# Patient Record
Sex: FEMALE | Race: WHITE | Employment: OTHER | URBAN - METROPOLITAN AREA
[De-identification: names, ages, dates, MRNs, and addresses within clinical notes are randomized per-mention and may not be internally consistent; named-entity substitution may affect disease eponyms.]

---

## 2017-12-06 LAB
HPV COMMENT: NORMAL
HPV TYPE 16: NOT DETECTED
HPV TYPE 18: NOT DETECTED
HPVOH (OTHER TYPES): NOT DETECTED

## 2020-03-17 ENCOUNTER — OFFICE VISIT (OUTPATIENT)
Dept: FAMILY MEDICINE CLINIC | Age: 40
End: 2020-03-17
Payer: COMMERCIAL

## 2020-03-17 VITALS
HEIGHT: 67 IN | OXYGEN SATURATION: 99 % | WEIGHT: 201.2 LBS | HEART RATE: 99 BPM | SYSTOLIC BLOOD PRESSURE: 120 MMHG | BODY MASS INDEX: 31.58 KG/M2 | DIASTOLIC BLOOD PRESSURE: 76 MMHG

## 2020-03-17 DIAGNOSIS — E03.9 ACQUIRED HYPOTHYROIDISM: ICD-10-CM

## 2020-03-17 DIAGNOSIS — Z00.00 WELL ADULT EXAM: ICD-10-CM

## 2020-03-17 PROBLEM — E66.811 CLASS 1 OBESITY DUE TO EXCESS CALORIES WITH SERIOUS COMORBIDITY AND BODY MASS INDEX (BMI) OF 31.0 TO 31.9 IN ADULT: Status: ACTIVE | Noted: 2020-03-17

## 2020-03-17 PROBLEM — E66.09 CLASS 1 OBESITY DUE TO EXCESS CALORIES WITH SERIOUS COMORBIDITY AND BODY MASS INDEX (BMI) OF 31.0 TO 31.9 IN ADULT: Status: ACTIVE | Noted: 2020-03-17

## 2020-03-17 LAB
A/G RATIO: 1.4 (ref 1.1–2.2)
ALBUMIN SERPL-MCNC: 4.4 G/DL (ref 3.4–5)
ALP BLD-CCNC: 52 U/L (ref 40–129)
ALT SERPL-CCNC: 32 U/L (ref 10–40)
ANION GAP SERPL CALCULATED.3IONS-SCNC: 19 MMOL/L (ref 3–16)
AST SERPL-CCNC: 20 U/L (ref 15–37)
BASOPHILS ABSOLUTE: 0 K/UL (ref 0–0.2)
BASOPHILS RELATIVE PERCENT: 0.7 %
BILIRUB SERPL-MCNC: 1.6 MG/DL (ref 0–1)
BUN BLDV-MCNC: 13 MG/DL (ref 7–20)
CALCIUM SERPL-MCNC: 9.1 MG/DL (ref 8.3–10.6)
CHLORIDE BLD-SCNC: 97 MMOL/L (ref 99–110)
CHOLESTEROL, TOTAL: 245 MG/DL (ref 0–199)
CO2: 18 MMOL/L (ref 21–32)
CREAT SERPL-MCNC: 0.6 MG/DL (ref 0.6–1.1)
CREATININE URINE: 185.8 MG/DL (ref 28–259)
EOSINOPHILS ABSOLUTE: 0.1 K/UL (ref 0–0.6)
EOSINOPHILS RELATIVE PERCENT: 2 %
GFR AFRICAN AMERICAN: >60
GFR NON-AFRICAN AMERICAN: >60
GLOBULIN: 3.1 G/DL
GLUCOSE BLD-MCNC: 264 MG/DL (ref 70–99)
HCT VFR BLD CALC: 44.1 % (ref 36–48)
HDLC SERPL-MCNC: 42 MG/DL (ref 40–60)
HEMOGLOBIN: 14.9 G/DL (ref 12–16)
LDL CHOLESTEROL CALCULATED: 159 MG/DL
LYMPHOCYTES ABSOLUTE: 1.6 K/UL (ref 1–5.1)
LYMPHOCYTES RELATIVE PERCENT: 26.3 %
MCH RBC QN AUTO: 30.8 PG (ref 26–34)
MCHC RBC AUTO-ENTMCNC: 33.9 G/DL (ref 31–36)
MCV RBC AUTO: 90.9 FL (ref 80–100)
MICROALBUMIN UR-MCNC: <1.2 MG/DL
MICROALBUMIN/CREAT UR-RTO: NORMAL MG/G (ref 0–30)
MONOCYTES ABSOLUTE: 0.5 K/UL (ref 0–1.3)
MONOCYTES RELATIVE PERCENT: 9 %
NEUTROPHILS ABSOLUTE: 3.7 K/UL (ref 1.7–7.7)
NEUTROPHILS RELATIVE PERCENT: 62 %
PDW BLD-RTO: 13.1 % (ref 12.4–15.4)
PLATELET # BLD: 235 K/UL (ref 135–450)
PMV BLD AUTO: 10.1 FL (ref 5–10.5)
POTASSIUM SERPL-SCNC: 4.2 MMOL/L (ref 3.5–5.1)
RBC # BLD: 4.85 M/UL (ref 4–5.2)
SODIUM BLD-SCNC: 134 MMOL/L (ref 136–145)
T4 FREE: 0.9 NG/DL (ref 0.9–1.8)
TOTAL PROTEIN: 7.5 G/DL (ref 6.4–8.2)
TRIGL SERPL-MCNC: 219 MG/DL (ref 0–150)
TSH SERPL DL<=0.05 MIU/L-ACNC: 8.56 UIU/ML (ref 0.27–4.2)
VLDLC SERPL CALC-MCNC: 44 MG/DL
WBC # BLD: 6 K/UL (ref 4–11)

## 2020-03-17 PROCEDURE — 99386 PREV VISIT NEW AGE 40-64: CPT | Performed by: NURSE PRACTITIONER

## 2020-03-17 PROCEDURE — 99202 OFFICE O/P NEW SF 15 MIN: CPT | Performed by: NURSE PRACTITIONER

## 2020-03-17 RX ORDER — OMEPRAZOLE 40 MG/1
40 CAPSULE, DELAYED RELEASE ORAL DAILY
COMMUNITY
Start: 2016-07-07 | End: 2020-03-17 | Stop reason: ALTCHOICE

## 2020-03-17 RX ORDER — LEVOTHYROXINE SODIUM 0.05 MG/1
50 TABLET ORAL DAILY
COMMUNITY
Start: 2018-07-24 | End: 2020-03-17 | Stop reason: ALTCHOICE

## 2020-03-17 RX ORDER — ALBUTEROL SULFATE 90 UG/1
2 AEROSOL, METERED RESPIRATORY (INHALATION) 4 TIMES DAILY PRN
COMMUNITY
Start: 2019-05-06 | End: 2020-03-17

## 2020-03-17 ASSESSMENT — PATIENT HEALTH QUESTIONNAIRE - PHQ9
2. FEELING DOWN, DEPRESSED OR HOPELESS: 0
SUM OF ALL RESPONSES TO PHQ9 QUESTIONS 1 & 2: 0
SUM OF ALL RESPONSES TO PHQ QUESTIONS 1-9: 0
SUM OF ALL RESPONSES TO PHQ QUESTIONS 1-9: 0
1. LITTLE INTEREST OR PLEASURE IN DOING THINGS: 0

## 2020-03-17 NOTE — PROGRESS NOTES
Chief Complaint:   Анна Ramírez is a 35 y.o. female who presents to [facility] care. Former PCP: Dr. Sarita Burnett, over 1 yr since last appt    History of Present Illness:    Diabetes: no meds in approx 18 months. Does not check sugars. Over 1 yr since last eye exam. Pt complains of nothing. Pt denies foot ulcerations, nausea, paresthesia of the feet, polydipsia, polyuria and visual disturbances. Stopped Metformin IR d/t diarrhea. Was prescribed Actos- never started d/t fear of side effects. Hypothyroidism: Recent symptoms: none. She denies fatigue, cold intolerance, hair loss, dry skin and constipation. Patient has not had meds in 18 months    Chronic low back pain: partial discectomy 2010, pain moderately controlled since. No current specialist.     Obesity: weight is stable, no current weight loss efforts. Walks when she can, diet mostly well controlled    Past Medical History:   Diagnosis Date    Acquired hypothyroidism 5/21/2018    Class 1 obesity due to excess calories with serious comorbidity and body mass index (BMI) of 31.0 to 31.9 in adult     Degeneration of lumbar or lumbosacral intervertebral disc 12/2/2009    Lumbosacral spondylosis without myelopathy 12/2/2009    Uncontrolled type 2 diabetes mellitus without complication, without long-term current use of insulin ([facility] [address] 5/21/2018       Past Surgical History:   Procedure Laterality Date    BACK SURGERY  2010    L4-L5 partial disctectomy        No outpatient medications have been marked as taking for the 3/17/20 encounter (Office Visit) with DULCE Edwards CNP.      Allergies   Allergen Reactions    Metformin Diarrhea       Social History     Socioeconomic History    Marital status:      Spouse name: None    Number of children: None    Years of education: None    Highest education level: None   Occupational History    None   Social Needs    Financial resource strain: None    Food insecurity     Worry: None     Inability: palpitations  Gastrointestinal: no nausea, vomiting, heartburn, diarrhea, constipation, bloating, or abdominal pain  Genitourinary: no urinary urgency, frequency, dysuria, nocturia, hesitancy, or incontinence  Musculoskeletal: no arthritis, arthralgia, myalgia, weakness, or morning stiffness  Neurologic: no paralysis, paresis, paresthesia, seizures, tremors, or headaches  Hematologic/Lymphatic/Immunologic: no anemia, abnormal bleeding/bruising, fever, chills, night sweats, swollen glands, or unexplained weight loss  Endocrine: no heat or cold intolerance and no polyphagia, polydipsia, or polyuria    PHYSICAL EXAMINATION:  /76   Pulse 99   Ht 5' 6.5\" (1.689 m)   Wt 201 lb 3.2 oz (91.3 kg)   SpO2 99%   Breastfeeding No   BMI 31.99 kg/m²   Constitutional: Oriented to person, place, and time. Appears well-developed and well-nourished. No distress. HENT:   Head: Normocephalic and atraumatic. Ears: Hearing, tympanic membrane, external ear and ear canal normal.   Nose: Nose normal.   Mouth/Throat: Uvula is midline, oropharynx is clear and moist and mucous membranes are normal.   Eyes: Conjunctivae and EOM are normal. Pupils are equal, round, and reactive to light. Neck: Normal range of motion. Neck supple. Normal carotid pulses and no JVD present. Carotid bruit is not present. No tracheal deviation present. No mass and no thyromegaly present. Cardiovascular: Normal rate, regular rhythm, S1 normal, S2 normal, normal heart sounds and intact distal pulses. No murmur heard. Pulmonary/Chest: Effort normal and breath sounds normal. No stridor. No respiratory distress. No decreased breath sounds. No wheezes. No rhonchi. No rales. Abdominal: Soft. Normal appearance and bowel sounds are normal. No distension, no abdominal bruit and no mass. There is no hepatomegaly. No tenderness. Musculoskeletal: Normal range of motion. No edema. Lymphadenopathy:     No cervical adenopathy.        No supraclavicular body mass index (BMI) of 31.0 to 31.9 in adult  Strongly recommend eliminating concentrated sweets, reducing simple carbs. Avoid corn syrup and hydrogenated oils. Focus on fruits, vegs, whole grains, lean meats and push water. Fish oil, high fiber foods recommended. Recommended at least 30 minutes of aerobic exercise daily. 6. Acquired hypothyroidism  Awaiting TSH to determine med dosage  -     T4, Free; Future  -     TSH without Reflex; Future    7. Screening for breast cancer  -     JANELLE PEYTON DIGITAL SCREEN BILATERAL; Future    8. Encounter to establish care  No old records for review  Office policies reviewed today  See pt instructions  F/u prn  Discussed use, benefit, and side effects of prescribed medications. All patient questions answered. Pt voiced understanding.

## 2020-03-17 NOTE — PATIENT INSTRUCTIONS
condoms. For men  · Tests for sexually transmitted infections (STIs). Ask whether you should have tests for STIs. You may be at risk if you have sex with more than one person, especially if you do not wear a condom. · Testicular cancer exam. Ask your doctor whether you should check your testicles regularly. · Prostate exam. Talk to your doctor about whether you should have a blood test (called a PSA test) for prostate cancer. Experts differ on whether and when men should have this test. Some experts suggest it if you are older than 39 and are -American or have a father or brother who got prostate cancer when he was younger than 72. When should you call for help? Watch closely for changes in your health, and be sure to contact your doctor if you have any problems or symptoms that concern you. Where can you learn more? Go to https://Voaltepecateb.healthMixP3 Inc.. org and sign in to your [employer] account. Enter P072 in the [employer] box to learn more about \"Well Visit, Ages 25 to 48: Care Instructions. \"     If you do not have an account, please click on the \"Sign Up Now\" link. Current as of: August 21, 2019Content Version: 12.4  © 6668-6067 Healthwise, Incorporated. Care instructions adapted under license by [facility] ([facility]). If you have questions about a medical condition or this instruction, always ask your healthcare professional. [address]  any warranty or liability for your use of this information.

## 2020-03-18 LAB
ESTIMATED AVERAGE GLUCOSE: 220.2 MG/DL
HBA1C MFR BLD: 9.3 %

## 2020-03-18 RX ORDER — ATORVASTATIN CALCIUM 20 MG/1
20 TABLET, FILM COATED ORAL DAILY
Qty: 90 TABLET | Refills: 1 | Status: SHIPPED | OUTPATIENT
Start: 2020-03-18 | End: 2020-09-21

## 2020-03-18 RX ORDER — LEVOTHYROXINE SODIUM 0.05 MG/1
50 TABLET ORAL DAILY
Qty: 90 TABLET | Refills: 0 | Status: SHIPPED | OUTPATIENT
Start: 2020-03-18 | End: 2020-06-16 | Stop reason: SDUPTHER

## 2020-03-18 RX ORDER — METFORMIN HYDROCHLORIDE 500 MG/1
TABLET, EXTENDED RELEASE ORAL
Qty: 90 TABLET | Refills: 1 | Status: SHIPPED | OUTPATIENT
Start: 2020-03-18 | End: 2020-06-12

## 2020-03-18 RX ORDER — ASPIRIN 81 MG/1
81 TABLET ORAL DAILY
Qty: 90 TABLET | Refills: 1 | Status: SHIPPED | OUTPATIENT
Start: 2020-03-18 | End: 2020-09-21

## 2020-03-18 RX ORDER — LISINOPRIL 5 MG/1
5 TABLET ORAL DAILY
Qty: 90 TABLET | Refills: 1 | Status: SHIPPED | OUTPATIENT
Start: 2020-03-18 | End: 2020-09-21

## 2020-06-12 RX ORDER — METFORMIN HYDROCHLORIDE 500 MG/1
TABLET, EXTENDED RELEASE ORAL
Qty: 90 TABLET | Refills: 2 | Status: SHIPPED | OUTPATIENT
Start: 2020-06-12 | End: 2020-10-07 | Stop reason: DRUGHIGH

## 2020-06-12 NOTE — TELEPHONE ENCOUNTER
Medication:   Requested Prescriptions     Pending Prescriptions Disp Refills    metFORMIN (GLUCOPHAGE-XR) 500 MG extended release tablet [Pharmacy Med Name: METFORMIN HCL  MG TABLET] 90 tablet 1     Sig: TAKE 1 TABLET BY MOUTH AFTER EVENING MEAL FOR 2 WEEKS, THEN INCREASE TO 2 TABLETS       Last Filled:  3/18/20, 90, 1 refill     Patient Phone Number: [phone] (home)     Last appt: 3/17/2020   Next appt: 6/15/2020    Last Labs DM:   Lab Results   Component Value Date    LABA1C 9.3 03/17/2020

## 2020-06-15 ENCOUNTER — OFFICE VISIT (OUTPATIENT)
Dept: FAMILY MEDICINE CLINIC | Age: 40
End: 2020-06-15
Payer: COMMERCIAL

## 2020-06-15 VITALS
OXYGEN SATURATION: 99 % | TEMPERATURE: 97.2 F | WEIGHT: 202.4 LBS | DIASTOLIC BLOOD PRESSURE: 84 MMHG | BODY MASS INDEX: 32.18 KG/M2 | HEART RATE: 95 BPM | SYSTOLIC BLOOD PRESSURE: 112 MMHG

## 2020-06-15 PROBLEM — E78.5 HYPERLIPIDEMIA: Status: ACTIVE | Noted: 2020-06-15

## 2020-06-15 LAB — HBA1C MFR BLD: 8.9 %

## 2020-06-15 PROCEDURE — 3052F HG A1C>EQUAL 8.0%<EQUAL 9.0%: CPT | Performed by: NURSE PRACTITIONER

## 2020-06-15 PROCEDURE — 83036 HEMOGLOBIN GLYCOSYLATED A1C: CPT | Performed by: NURSE PRACTITIONER

## 2020-06-15 PROCEDURE — 99214 OFFICE O/P EST MOD 30 MIN: CPT | Performed by: NURSE PRACTITIONER

## 2020-06-15 RX ORDER — EMPAGLIFLOZIN 10 MG/1
1 TABLET, FILM COATED ORAL DAILY
Qty: 30 TABLET | Refills: 5 | Status: SHIPPED | OUTPATIENT
Start: 2020-06-15 | End: 2020-10-07

## 2020-06-15 ASSESSMENT — ENCOUNTER SYMPTOMS
NAUSEA: 0
VOMITING: 0
ABDOMINAL PAIN: 0
DIARRHEA: 0
SHORTNESS OF BREATH: 0

## 2020-06-16 ENCOUNTER — TELEPHONE (OUTPATIENT)
Dept: FAMILY MEDICINE CLINIC | Age: 40
End: 2020-06-16

## 2020-06-16 LAB
T4 FREE: 1.1 NG/DL (ref 0.9–1.8)
TSH SERPL DL<=0.05 MIU/L-ACNC: 5.11 UIU/ML (ref 0.27–4.2)

## 2020-06-16 RX ORDER — LEVOTHYROXINE SODIUM 0.07 MG/1
75 TABLET ORAL DAILY
Qty: 30 TABLET | Refills: 5 | Status: SHIPPED | OUTPATIENT
Start: 2020-06-16 | End: 2021-06-09 | Stop reason: SDUPTHER

## 2020-07-20 ENCOUNTER — HOSPITAL ENCOUNTER (OUTPATIENT)
Dept: MAMMOGRAPHY | Age: 40
Discharge: HOME | End: 2020-07-20
Payer: COMMERCIAL

## 2020-07-20 PROCEDURE — 77063 BREAST TOMOSYNTHESIS BI: CPT

## 2020-09-21 RX ORDER — ASPIRIN 81 MG/1
TABLET, COATED ORAL
Qty: 90 TABLET | Refills: 1 | Status: SHIPPED | OUTPATIENT
Start: 2020-09-21 | End: 2021-03-30

## 2020-09-21 RX ORDER — ATORVASTATIN CALCIUM 20 MG/1
TABLET, FILM COATED ORAL
Qty: 90 TABLET | Refills: 0 | Status: SHIPPED | OUTPATIENT
Start: 2020-09-21 | End: 2020-12-21 | Stop reason: SDUPTHER

## 2020-09-21 RX ORDER — LISINOPRIL 5 MG/1
TABLET ORAL
Qty: 90 TABLET | Refills: 0 | Status: SHIPPED | OUTPATIENT
Start: 2020-09-21 | End: 2020-12-21

## 2020-10-07 ENCOUNTER — OFFICE VISIT (OUTPATIENT)
Dept: FAMILY MEDICINE CLINIC | Age: 40
End: 2020-10-07
Payer: COMMERCIAL

## 2020-10-07 VITALS
BODY MASS INDEX: 31.99 KG/M2 | HEART RATE: 92 BPM | SYSTOLIC BLOOD PRESSURE: 104 MMHG | WEIGHT: 201.2 LBS | OXYGEN SATURATION: 99 % | DIASTOLIC BLOOD PRESSURE: 76 MMHG

## 2020-10-07 PROBLEM — E11.9 TYPE 2 DIABETES MELLITUS WITHOUT COMPLICATION, WITHOUT LONG-TERM CURRENT USE OF INSULIN (HCC): Status: ACTIVE | Noted: 2018-05-21

## 2020-10-07 LAB — HBA1C MFR BLD: 8.5 %

## 2020-10-07 PROCEDURE — 90472 IMMUNIZATION ADMIN EACH ADD: CPT | Performed by: NURSE PRACTITIONER

## 2020-10-07 PROCEDURE — 90686 IIV4 VACC NO PRSV 0.5 ML IM: CPT | Performed by: NURSE PRACTITIONER

## 2020-10-07 PROCEDURE — 90471 IMMUNIZATION ADMIN: CPT | Performed by: NURSE PRACTITIONER

## 2020-10-07 PROCEDURE — 3052F HG A1C>EQUAL 8.0%<EQUAL 9.0%: CPT | Performed by: NURSE PRACTITIONER

## 2020-10-07 PROCEDURE — 83036 HEMOGLOBIN GLYCOSYLATED A1C: CPT | Performed by: NURSE PRACTITIONER

## 2020-10-07 PROCEDURE — 99214 OFFICE O/P EST MOD 30 MIN: CPT | Performed by: NURSE PRACTITIONER

## 2020-10-07 PROCEDURE — 90732 PPSV23 VACC 2 YRS+ SUBQ/IM: CPT | Performed by: NURSE PRACTITIONER

## 2020-10-07 RX ORDER — DIMETHICONE 13 MG/ML
1 LOTION TOPICAL DAILY
Qty: 100 EACH | Refills: 3 | Status: SHIPPED | OUTPATIENT
Start: 2020-10-07 | End: 2022-02-21

## 2020-10-07 RX ORDER — BLOOD-GLUCOSE METER
1 KIT MISCELLANEOUS DAILY
Qty: 1 KIT | Refills: 0 | Status: SHIPPED | OUTPATIENT
Start: 2020-10-07

## 2020-10-07 RX ORDER — CANAGLIFLOZIN 100 MG/1
100 TABLET, FILM COATED ORAL
Qty: 90 TABLET | Refills: 1 | Status: SHIPPED | OUTPATIENT
Start: 2020-10-07 | End: 2020-11-30

## 2020-10-07 RX ORDER — METFORMIN HYDROCHLORIDE 500 MG/1
TABLET, EXTENDED RELEASE ORAL
Qty: 90 TABLET | Refills: 2 | Status: SHIPPED | OUTPATIENT
Start: 2020-10-07 | End: 2020-11-02

## 2020-10-07 ASSESSMENT — ENCOUNTER SYMPTOMS
VOMITING: 0
NAUSEA: 0
SHORTNESS OF BREATH: 0
DIARRHEA: 0
ABDOMINAL PAIN: 0

## 2020-10-07 NOTE — PATIENT INSTRUCTIONS
Patient Education        Learning About the Risk of Heart Attack and Stroke With Diabetes  How are diabetes, heart attack, and stroke connected? For some people, diabetes can cause problems that increase the risk of a heart attack or stroke. Many things can lead to a heart attack or stroke. These include high blood sugar, insulin resistance, high cholesterol, and high blood pressure. Lifestyle and genetics may also play a part. But here's the good news: The things you're doing to stay healthy with diabetes also help your heart and blood vessels. That means eating healthy foods, quitting smoking, and getting exercise. What increases your risk for heart attack and stroke? When you have diabetes, your risk for heart attack and stroke is even higher if you have:  · High blood pressure. It pushes blood through the arteries with too much force. Over time, this damages the walls of the arteries. · High cholesterol. It causes the buildup of a kind of fat inside the blood vessel walls. This buildup can lower blood flow to the heart muscle and raise your risk for having a heart attack or stroke. · Kidney damage. It shares many of the risk factors for heart attack and stroke (such as high blood sugar, high blood pressure, and high cholesterol). How do you keep your heart healthy when you have diabetes? Managing your diabetes and keeping your heart and blood vessels healthy are both important. Here are some things you can do. · Test your blood sugar levels and get your diabetes tests on schedule. Try to keep your numbers within your target range. · Keep track of your blood pressure. Your doctor will give you a goal that's right for you. If your blood pressure is high, your treatment may also include medicine. Changes in your lifestyle, such as staying at a healthy weight, may also help you lower your blood pressure. · Eat heart-healthy foods.    These include fruits, vegetables, whole grains, fish, and low-fat or nonfat dairy foods. Limit sodium, alcohol, and sweets. · If your doctor recommends it, get more exercise. Walking is a good choice. Bit by bit, increase the amount you walk every day. Try for at least 30 minutes on most days of the week. · Don't smoke. Smoking can make diabetes worse and increase your risk of heart attack or stroke. If you need help quitting, talk to your doctor about stop-smoking programs and medicines. These can increase your chances of quitting for good. · Think about taking medicines for your heart. For example, your doctor may suggest taking a statin or daily aspirin. Where can you learn more? Go to https://Eco-Source TechnologiespeAnyPresenceeb.IPTEGO. org and sign in to your [employer] account. Enter J703 in the [employer] box to learn more about \"Learning About the Risk of Heart Attack and Stroke With Diabetes. \"     If you do not have an account, please click on the \"Sign Up Now\" link. Current as of: December 20, 2019               Content Version: 12.6  © 7334-0646 [employer], Incorporated. Care instructions adapted under license by [facility] ([facility]). If you have questions about a medical condition or this instruction, always ask your healthcare professional. [place] [address] any warranty or liability for your use of this information. unknown no

## 2020-10-07 NOTE — PROGRESS NOTES
Vaccine Information Sheet, \"Influenza - Inactivated\"  given to Mery Singletno, or parent/legal guardian of  Mery Singleton and verbalized understanding. Patient responses:    Have you ever had a reaction to a flu vaccine? No  Do you have any current illness? No  Have you ever had Guillian Jackson Syndrome? No  Do you have a serious allergy to any of the follow: Neomycin, Polymyxin, Thimerosal, eggs or egg products? No    Flu vaccine given per order. Please see immunization tab. Risks and benefits explained. Current VIS given.

## 2020-10-07 NOTE — PROGRESS NOTES
Raquel Nance is a 38 y.o. female. HPI:  Diabetes Follow-up--   Lab Results   Component Value Date    LABA1C 8.5 10/07/2020      Lab Results   Component Value Date    .2 03/17/2020     Checks sugars at home:No. patient does not test.  Last Eye Exam was 7/17/20. Pt is on ACEI or ARB. Pt complains of polyuria and polydipsia. Pt denies foot ulcerations, nausea, paresthesia of the feet, and visual disturbances. She also denies chest pain, dyspnea, headache, palpitations and peripheral edema. She is taking ASA 81 mg daily. She is taking Metformin 500mg with dinner daily. Diarrhea manageable with only taking once a day. Has not been taking Jardiance consistently d/t cost. Over $400/month. Diet is \"ok\". Hyperlipidemia:  She does not have myalgias from medication. Pt is  following Lifestyle modification including Low fat/low cholesterol diet,low carbohydrate diet, and does exercise : occasional walking. Lab Results   Component Value Date    LDLCALC 159 (H) 03/17/2020   . Hypothyroidism: Recent symptoms: none. She denies fatigue, weight gain, weight loss, cold intolerance, heat intolerance, hair loss, dry skin, constipation and diarrhea. Patient is  taking her medication consistently on an empty stomach.     Lab Results   Component Value Date    TSH 5.11 (H) 06/15/2020    TSH 8.56 (H) 03/17/2020    TSH 7.22 (H) 04/18/2012       Current Outpatient Medications   Medication Sig Dispense Refill    metFORMIN (GLUCOPHAGE-XR) 500 MG extended release tablet TAKE 1 TABLET BY MOUTH AFTER EVENING MEAL 90 tablet 2    canagliflozin (INVOKANA) 100 MG TABS tablet Take 1 tablet by mouth every morning (before breakfast) 90 tablet 1    Blood Glucose Monitoring Suppl (TRUERESULT BLOOD GLUCOSE) w/Device KIT 1 kit by Does not apply route daily 1 kit 0    [facility] Lancets Original MISC 1 each by Does not apply route daily 100 each 3    ASPIRIN LOW DOSE 81 MG EC tablet TAKE 1 TABLET BY MOUTH EVERY DAY 90 tablet 1  lisinopril (PRINIVIL;ZESTRIL) 5 MG tablet TAKE 1 TABLET BY MOUTH EVERY DAY 90 tablet 0    atorvastatin (LIPITOR) 20 MG tablet TAKE 1 TABLET BY MOUTH EVERY DAY 90 tablet 0    levothyroxine (SYNTHROID) 75 MCG tablet Take 1 tablet by mouth daily 30 tablet 5     No current facility-administered medications for this visit. Health Maintenance   Topic Date Due    Hepatitis B vaccine (1 of 3 - Risk 3-dose series) 10/07/2021 (Originally 7/7/1998)    Diabetic microalbuminuria test  03/17/2021    Lipid screen  03/17/2021    Potassium monitoring  03/17/2021    Creatinine monitoring  03/17/2021    Diabetic foot exam  06/15/2021    TSH testing  06/15/2021    Diabetic retinal exam  07/17/2021    A1C test (Diabetic or Prediabetic)  10/07/2021    Cervical cancer screen  12/04/2022    DTaP/Tdap/Td vaccine (2 - Td) 12/02/2024    Flu vaccine  Completed    Pneumococcal 0-64 years Vaccine  Completed    Hepatitis A vaccine  Aged Out    Hib vaccine  Aged Out    Meningococcal (ACWY) vaccine  Aged Out    HIV screen  Discontinued       Review of Systems   Constitutional: Negative for diaphoresis. Respiratory: Negative for shortness of breath. Cardiovascular: Negative for chest pain, palpitations and leg swelling. Gastrointestinal: Negative for abdominal pain, diarrhea, nausea and vomiting. Endocrine: Negative for polydipsia. Musculoskeletal: Negative for myalgias. Neurological: Negative for dizziness and headaches. I have reviewed the patient's medical history in detail and updated the computerized patient record as appropriate. Physical Exam  Vitals signs reviewed. Constitutional:       Appearance: Normal appearance. She is well-developed. Neck:      Vascular: No carotid bruit. Cardiovascular:      Rate and Rhythm: Normal rate and regular rhythm. Pulses:           Radial pulses are 2+ on the right side and 2+ on the left side. Heart sounds: Normal heart sounds. No murmur. fruits, vegs, whole grains, lean meats and push water. Fish oil, high fiber foods recommended. Recommended at least 30 minutes of aerobic exercise daily. 4. Acquired hypothyroidism  Awaiting Repeat TSH  No changes today    5. Need for vaccination  -     INFLUENZA, QUADV, 3 YRS AND OLDER, IM PF, PREFILL SYR OR SDV, 0.5ML (AFLURIA QUADV, PF)  -     Pneumococcal polysaccharide vaccine 23-valent greater than or equal to 3yo subcutaneous/IM  See pt instructions  F/u 3 months, sooner prn  Discussed use, benefit, and side effects of prescribed medications. All patient questions answered. Pt voiced understanding.

## 2020-11-30 RX ORDER — CANAGLIFLOZIN 100 MG/1
TABLET, FILM COATED ORAL
Qty: 30 TABLET | Refills: 5 | Status: SHIPPED | OUTPATIENT
Start: 2020-11-30 | End: 2021-03-16

## 2020-11-30 NOTE — TELEPHONE ENCOUNTER
Medication:   Requested Prescriptions     Pending Prescriptions Disp Refills    INVOKANA 100 MG TABS tablet [Pharmacy Med Name: INVOKANA 100 MG TABLET] 30 tablet 5     Sig: TAKE 1 TABLET BY MOUTH EVERY DAY BEFORE BREAKFAST       Last Filled:  10/07/2020 #90 1 rf     Patient Phone Number: [phone] (home)     Last appt: 10/7/2020   Next appt: Visit date not found    Last Labs DM:   Lab Results   Component Value Date    LABA1C 8.5 10/07/2020

## 2020-12-21 RX ORDER — LISINOPRIL 5 MG/1
5 TABLET ORAL DAILY
Qty: 90 TABLET | Refills: 1 | Status: SHIPPED | OUTPATIENT
Start: 2020-12-21 | End: 2021-07-06

## 2020-12-21 RX ORDER — ATORVASTATIN CALCIUM 20 MG/1
TABLET, FILM COATED ORAL
Qty: 90 TABLET | Refills: 3 | Status: SHIPPED | OUTPATIENT
Start: 2020-12-21 | End: 2021-03-17 | Stop reason: SDUPTHER

## 2020-12-21 NOTE — TELEPHONE ENCOUNTER
Medication and Quantity requested: atorvastatin (LIPITOR) 20 MG tablet-90 tablets with 1 refill-         Last Visit  10/7/2020    Pharmacy and phone number updated in EPIC:  yes  [facility] FAX: [phone]

## 2020-12-29 ENCOUNTER — TELEPHONE (OUTPATIENT)
Dept: FAMILY MEDICINE CLINIC | Age: 40
End: 2020-12-29

## 2020-12-29 NOTE — TELEPHONE ENCOUNTER
Pt states insurance is not covering jardiance or invakana. She wants to know is there any other alternative?

## 2021-03-16 ENCOUNTER — OFFICE VISIT (OUTPATIENT)
Dept: FAMILY MEDICINE CLINIC | Age: 41
End: 2021-03-16
Payer: COMMERCIAL

## 2021-03-16 ENCOUNTER — TELEPHONE (OUTPATIENT)
Dept: FAMILY MEDICINE CLINIC | Age: 41
End: 2021-03-16

## 2021-03-16 VITALS
WEIGHT: 201.2 LBS | DIASTOLIC BLOOD PRESSURE: 78 MMHG | BODY MASS INDEX: 31.99 KG/M2 | HEART RATE: 101 BPM | OXYGEN SATURATION: 100 % | SYSTOLIC BLOOD PRESSURE: 114 MMHG

## 2021-03-16 DIAGNOSIS — Z00.00 WELL ADULT EXAM: ICD-10-CM

## 2021-03-16 DIAGNOSIS — E11.9 TYPE 2 DIABETES MELLITUS WITHOUT COMPLICATION, WITHOUT LONG-TERM CURRENT USE OF INSULIN (HCC): Primary | ICD-10-CM

## 2021-03-16 DIAGNOSIS — E03.9 ACQUIRED HYPOTHYROIDISM: ICD-10-CM

## 2021-03-16 DIAGNOSIS — E78.5 HYPERLIPIDEMIA, UNSPECIFIED HYPERLIPIDEMIA TYPE: ICD-10-CM

## 2021-03-16 DIAGNOSIS — E66.09 CLASS 1 OBESITY DUE TO EXCESS CALORIES WITH SERIOUS COMORBIDITY AND BODY MASS INDEX (BMI) OF 31.0 TO 31.9 IN ADULT: ICD-10-CM

## 2021-03-16 LAB
A/G RATIO: 1.5 (ref 1.1–2.2)
ALBUMIN SERPL-MCNC: 4.5 G/DL (ref 3.4–5)
ALP BLD-CCNC: 52 U/L (ref 40–129)
ALT SERPL-CCNC: 42 U/L (ref 10–40)
ANION GAP SERPL CALCULATED.3IONS-SCNC: 16 MMOL/L (ref 3–16)
AST SERPL-CCNC: 24 U/L (ref 15–37)
BASOPHILS ABSOLUTE: 0 K/UL (ref 0–0.2)
BASOPHILS RELATIVE PERCENT: 0.5 %
BILIRUB SERPL-MCNC: 1.7 MG/DL (ref 0–1)
BUN BLDV-MCNC: 11 MG/DL (ref 7–20)
CALCIUM SERPL-MCNC: 9.4 MG/DL (ref 8.3–10.6)
CHLORIDE BLD-SCNC: 100 MMOL/L (ref 99–110)
CHOLESTEROL, FASTING: 171 MG/DL (ref 0–199)
CO2: 21 MMOL/L (ref 21–32)
CREAT SERPL-MCNC: 0.6 MG/DL (ref 0.6–1.1)
EOSINOPHILS ABSOLUTE: 0.1 K/UL (ref 0–0.6)
EOSINOPHILS RELATIVE PERCENT: 0.9 %
GFR AFRICAN AMERICAN: >60
GFR NON-AFRICAN AMERICAN: >60
GLOBULIN: 3 G/DL
GLUCOSE FASTING: 204 MG/DL (ref 70–99)
HBA1C MFR BLD: 9 %
HCT VFR BLD CALC: 45.3 % (ref 36–48)
HDLC SERPL-MCNC: 44 MG/DL (ref 40–60)
HEMOGLOBIN: 15 G/DL (ref 12–16)
LDL CHOLESTEROL CALCULATED: 106 MG/DL
LYMPHOCYTES ABSOLUTE: 1.6 K/UL (ref 1–5.1)
LYMPHOCYTES RELATIVE PERCENT: 25.2 %
MCH RBC QN AUTO: 29.9 PG (ref 26–34)
MCHC RBC AUTO-ENTMCNC: 33.2 G/DL (ref 31–36)
MCV RBC AUTO: 90.2 FL (ref 80–100)
MONOCYTES ABSOLUTE: 0.5 K/UL (ref 0–1.3)
MONOCYTES RELATIVE PERCENT: 7 %
NEUTROPHILS ABSOLUTE: 4.3 K/UL (ref 1.7–7.7)
NEUTROPHILS RELATIVE PERCENT: 66.4 %
PDW BLD-RTO: 13.2 % (ref 12.4–15.4)
PLATELET # BLD: 279 K/UL (ref 135–450)
PMV BLD AUTO: 9.8 FL (ref 5–10.5)
POTASSIUM SERPL-SCNC: 4.2 MMOL/L (ref 3.5–5.1)
RBC # BLD: 5.03 M/UL (ref 4–5.2)
SODIUM BLD-SCNC: 137 MMOL/L (ref 136–145)
T4 FREE: 1.2 NG/DL (ref 0.9–1.8)
TOTAL PROTEIN: 7.5 G/DL (ref 6.4–8.2)
TRIGLYCERIDE, FASTING: 103 MG/DL (ref 0–150)
TSH SERPL DL<=0.05 MIU/L-ACNC: 3.8 UIU/ML (ref 0.27–4.2)
VLDLC SERPL CALC-MCNC: 21 MG/DL
WBC # BLD: 6.5 K/UL (ref 4–11)

## 2021-03-16 PROCEDURE — 83036 HEMOGLOBIN GLYCOSYLATED A1C: CPT | Performed by: NURSE PRACTITIONER

## 2021-03-16 PROCEDURE — 3052F HG A1C>EQUAL 8.0%<EQUAL 9.0%: CPT | Performed by: NURSE PRACTITIONER

## 2021-03-16 PROCEDURE — 99214 OFFICE O/P EST MOD 30 MIN: CPT | Performed by: NURSE PRACTITIONER

## 2021-03-16 RX ORDER — DULAGLUTIDE 0.75 MG/.5ML
0.75 INJECTION, SOLUTION SUBCUTANEOUS WEEKLY
Qty: 4 PEN | Refills: 5 | Status: SHIPPED | OUTPATIENT
Start: 2021-03-16 | End: 2021-06-09 | Stop reason: ALTCHOICE

## 2021-03-16 ASSESSMENT — PATIENT HEALTH QUESTIONNAIRE - PHQ9
SUM OF ALL RESPONSES TO PHQ9 QUESTIONS 1 & 2: 0
SUM OF ALL RESPONSES TO PHQ QUESTIONS 1-9: 0
2. FEELING DOWN, DEPRESSED OR HOPELESS: 0
1. LITTLE INTEREST OR PLEASURE IN DOING THINGS: 0
SUM OF ALL RESPONSES TO PHQ QUESTIONS 1-9: 0
SUM OF ALL RESPONSES TO PHQ QUESTIONS 1-9: 0

## 2021-03-16 ASSESSMENT — ENCOUNTER SYMPTOMS
NAUSEA: 0
DIARRHEA: 0
SHORTNESS OF BREATH: 0
VOMITING: 0
ABDOMINAL PAIN: 0

## 2021-03-16 NOTE — TELEPHONE ENCOUNTER
Pt had an appt with Neena Baumgarten today. Pt forgot to give Rosana TolliverBaumgarten the Preventive Screening form to complete. Pt said her insurance runs August - July. Her insurance is ok with having paperwork filled out 2 months late.   She is asking that this form be dated September 2020.    (scanned form into media)

## 2021-03-16 NOTE — PATIENT INSTRUCTIONS
Patient Education        Learning About the Risk of Heart Attack and Stroke With Diabetes  How are diabetes, heart attack, and stroke connected? For some people, diabetes can cause problems that increase the risk of a heart attack or stroke. Many things can lead to a heart attack or stroke. These include high blood sugar, insulin resistance, high cholesterol, and high blood pressure. Lifestyle and genetics may also play a part. But here's the good news: The things you're doing to stay healthy with diabetes also help your heart and blood vessels. That means eating healthy foods, quitting smoking, and getting exercise. What increases your risk for heart attack and stroke? When you have diabetes, your risk for heart attack and stroke is even higher if you have:  · High blood pressure. It pushes blood through the arteries with too much force. Over time, this damages the walls of the arteries. · High cholesterol. It causes the buildup of a kind of fat inside the blood vessel walls. This buildup can lower blood flow to the heart muscle and raise your risk for having a heart attack or stroke. · Kidney damage. It shares many of the risk factors for heart attack and stroke (such as high blood sugar, high blood pressure, and high cholesterol). How do you keep your heart healthy when you have diabetes? Managing your diabetes and keeping your heart and blood vessels healthy are both important. Here are some things you can do. · Test your blood sugar levels and get your diabetes tests on schedule. Try to keep your numbers within your target range. · Keep track of your blood pressure. Your doctor will give you a goal that's right for you. If your blood pressure is high, your treatment may also include medicine. Changes in your lifestyle, such as staying at a healthy weight, may also help you lower your blood pressure. · Eat heart-healthy foods.    These include fruits, vegetables, whole grains, fish, and

## 2021-03-16 NOTE — PROGRESS NOTES
Marjan Sharma is a 38 y.o. female. HPI:  Diabetes Follow-up--   Lab Results   Component Value Date    LABA1C 9.0 03/16/2021      Lab Results   Component Value Date    .2 03/17/2020     Checks sugars at home: 200-300  Last Eye Exam was 7/17/20. Pt is on ACEI or ARB. Pt complains of polyuria and polydipsia. Pt denies foot ulcerations, nausea, paresthesia of the feet, and visual disturbances. She also denies chest pain, dyspnea, headache, palpitations and peripheral edema. She is taking ASA 81 mg daily. She is taking Metformin 500mg with dinner daily. Diarrhea manageable with only taking once a day. States \"I can't find any medication covered by my insurance that is affordable\". Exercise: walking daily. Diet is \"ok\". Hyperlipidemia:  She does not have myalgias from medication. Lab Results   Component Value Date    LDLCALC 159 (H) 03/17/2020   . Hypothyroidism: Recent symptoms: none. She denies fatigue, weight gain, weight loss, cold intolerance, heat intolerance, hair loss, dry skin, constipation and diarrhea. Patient is  taking her medication consistently on an empty stomach.     Lab Results   Component Value Date    TSH 5.11 (H) 06/15/2020    TSH 8.56 (H) 03/17/2020    TSH 7.22 (H) 04/18/2012       Current Outpatient Medications   Medication Sig Dispense Refill    Dulaglutide (TRULICITY) 7.29 FK/7.5WS SOPN Inject 0.75 mg into the skin once a week 4 pen 5    atorvastatin (LIPITOR) 20 MG tablet TAKE 1 TABLET BY MOUTH EVERY DAY 90 tablet 3    lisinopril (PRINIVIL;ZESTRIL) 5 MG tablet Take 1 tablet by mouth daily 90 tablet 1    metFORMIN (GLUCOPHAGE-XR) 500 MG extended release tablet TAKE 1 TABLET BY MOUTH AFTER EVENING MEAL FOR 2 WEEKS, THEN INCREASE TO 2 TABLETS 90 tablet 1    Blood Glucose Monitoring Suppl ([employer] BLOOD GLUCOSE) w/Device KIT 1 kit by Does not apply route daily 1 kit 0    CVS Lancets Original MISC 1 each by Does not apply route daily 100 each 3    ASPIRIN LOW DOSE 81 MG EC tablet TAKE 1 TABLET BY MOUTH EVERY DAY 90 tablet 1    levothyroxine (SYNTHROID) 75 MCG tablet Take 1 tablet by mouth daily 30 tablet 5     No current facility-administered medications for this visit. Health Maintenance   Topic Date Due    Hepatitis C screen  Never done    COVID-19 Vaccine (1) Never done    Diabetic microalbuminuria test  03/17/2021    Lipid screen  03/17/2021    Potassium monitoring  03/17/2021    Creatinine monitoring  03/17/2021    Hepatitis B vaccine (1 of 3 - Risk 3-dose series) 10/07/2021 (Originally 7/7/1998)    Diabetic foot exam  06/15/2021    TSH testing  06/15/2021    A1C test (Diabetic or Prediabetic)  06/16/2021    Diabetic retinal exam  07/17/2021    Cervical cancer screen  12/04/2022    DTaP/Tdap/Td vaccine (2 - Td) 12/02/2024    Flu vaccine  Completed    Pneumococcal 0-64 years Vaccine  Completed    Hepatitis A vaccine  Aged Out    Hib vaccine  Aged Out    Meningococcal (ACWY) vaccine  Aged Out    HIV screen  Discontinued       Review of Systems   Constitutional: Negative for diaphoresis. Respiratory: Negative for shortness of breath. Cardiovascular: Negative for chest pain, palpitations and leg swelling. Gastrointestinal: Negative for abdominal pain, diarrhea, nausea and vomiting. Endocrine: Negative for polydipsia. Musculoskeletal: Negative for myalgias. Neurological: Negative for dizziness and headaches. I have reviewed the patient's medical history in detail and updated the computerized patient record as appropriate. Physical Exam  Vitals signs reviewed. Constitutional:       Appearance: Normal appearance. She is well-developed. Neck:      Vascular: No carotid bruit. Cardiovascular:      Rate and Rhythm: Normal rate and regular rhythm. Pulses:           Radial pulses are 2+ on the right side and 2+ on the left side. Heart sounds: Normal heart sounds. No murmur.    Pulmonary:      Effort: Pulmonary effort is normal.      Breath sounds: Normal breath sounds. Abdominal:      General: Bowel sounds are normal.      Palpations: Abdomen is soft. Abdomen is not rigid. Tenderness: There is no abdominal tenderness. There is no guarding or rebound. Skin:     General: Skin is warm and dry. Neurological:      Mental Status: She is alert and oriented to person, place, and time. Psychiatric:         Behavior: Behavior normal.       /78   Pulse 101   Wt 201 lb 3.2 oz (91.3 kg)   LMP  (Exact Date)   SpO2 100%   Breastfeeding No   BMI 31.99 kg/m²     Hemoglobin A1C (%)   Date Value   03/16/2021 9.0   10/07/2020 8.5   06/15/2020 8.9         ASSESSMENT:  1. Type 2 diabetes mellitus without complication, without long-term current use of insulin ([facility] [address]    2. Hyperlipidemia, unspecified hyperlipidemia type    3. Acquired hypothyroidism    4. Class 1 obesity due to excess calories with serious comorbidity and body mass index (BMI) of 31.0 to 31.9 in adult        PLAN:  1. Type 2 diabetes mellitus without complication, without long-term current use of insulin (HCC)  Worsening   Continue Metformin Xr 500 mg daily  -     POCT glycosylated hemoglobin (Hb A1C)  New start-     Dulaglutide (TRULICITY) 9.84 JU/3.8MG SOPN; Inject 0.75 mg into the skin once a week  -     CBC WITH AUTO DIFFERENTIAL; Future  Demonstration in office today with Trulicity, Bydureon and Ozempic pens. Pt verbalized understanding  SGLT not covered by insurance  If GLP not covered by insurance will try glipizide and or insulin  Recommend weight reduction, healthy diet and regular exercise    2. Hyperlipidemia, unspecified hyperlipidemia type  FLP drawn today  No changes today    3. Acquired hypothyroidism  Awaiting TSH  No changes today    4.  Class 1 obesity due to excess calories with serious comorbidity and body mass index (BMI) of 31.0 to 31.9 in adult  Hopeful for weight loss with Trulicity  Strongly recommend eliminating concentrated sweets, reducing simple carbs. Avoid corn syrup and hydrogenated oils. Focus on fruits, vegs, whole grains, lean meats and push water. Fish oil, high fiber foods recommended. Recommended at least 30 minutes of aerobic exercise daily. See pt instructions  F/u 3 months, sooner prn  Discussed use, benefit, and side effects of prescribed medications. All patient questions answered. Pt voiced understanding.

## 2021-03-17 DIAGNOSIS — E78.5 HYPERLIPIDEMIA, UNSPECIFIED HYPERLIPIDEMIA TYPE: ICD-10-CM

## 2021-03-17 RX ORDER — ATORVASTATIN CALCIUM 40 MG/1
40 TABLET, FILM COATED ORAL DAILY
Qty: 90 TABLET | Refills: 3 | Status: SHIPPED | OUTPATIENT
Start: 2021-03-17 | End: 2022-05-23

## 2021-03-30 DIAGNOSIS — E11.65 TYPE 2 DIABETES MELLITUS WITH HYPERGLYCEMIA (HCC): ICD-10-CM

## 2021-03-30 RX ORDER — ASPIRIN 81 MG/1
TABLET, COATED ORAL
Qty: 90 TABLET | Refills: 1 | Status: SHIPPED | OUTPATIENT
Start: 2021-03-30 | End: 2022-05-23

## 2021-03-30 NOTE — TELEPHONE ENCOUNTER
Medication:   Requested Prescriptions     Pending Prescriptions Disp Refills    ASPIRIN LOW DOSE 81 MG EC tablet [Pharmacy Med Name: ASPIRIN EC 81 MG TABLET] 90 tablet 1     Sig: TAKE 1 TABLET BY MOUTH EVERY DAY        Last Filled:  9/21/20 #90 refills 1     Patient Phone Number: [phone] (home)     Last appt: 3/16/2021   Next appt: Visit date not found    Last OARRS: No flowsheet data found.

## 2021-06-09 ENCOUNTER — OFFICE VISIT (OUTPATIENT)
Dept: FAMILY MEDICINE CLINIC | Age: 41
End: 2021-06-09
Payer: COMMERCIAL

## 2021-06-09 VITALS
BODY MASS INDEX: 32.08 KG/M2 | HEIGHT: 67 IN | WEIGHT: 204.4 LBS | DIASTOLIC BLOOD PRESSURE: 66 MMHG | OXYGEN SATURATION: 99 % | HEART RATE: 92 BPM | SYSTOLIC BLOOD PRESSURE: 108 MMHG

## 2021-06-09 DIAGNOSIS — E66.09 CLASS 1 OBESITY DUE TO EXCESS CALORIES WITH SERIOUS COMORBIDITY AND BODY MASS INDEX (BMI) OF 31.0 TO 31.9 IN ADULT: ICD-10-CM

## 2021-06-09 DIAGNOSIS — E11.65 TYPE 2 DIABETES MELLITUS WITH HYPERGLYCEMIA, UNSPECIFIED WHETHER LONG TERM INSULIN USE (HCC): Primary | ICD-10-CM

## 2021-06-09 DIAGNOSIS — E03.9 ACQUIRED HYPOTHYROIDISM: ICD-10-CM

## 2021-06-09 LAB
CREATININE URINE POCT: 100
HBA1C MFR BLD: 7.6 %
MICROALBUMIN/CREAT 24H UR: 10 MG/G{CREAT}
MICROALBUMIN/CREAT UR-RTO: <30

## 2021-06-09 PROCEDURE — 83036 HEMOGLOBIN GLYCOSYLATED A1C: CPT | Performed by: NURSE PRACTITIONER

## 2021-06-09 PROCEDURE — 82044 UR ALBUMIN SEMIQUANTITATIVE: CPT | Performed by: NURSE PRACTITIONER

## 2021-06-09 PROCEDURE — 3051F HG A1C>EQUAL 7.0%<8.0%: CPT | Performed by: NURSE PRACTITIONER

## 2021-06-09 PROCEDURE — 99214 OFFICE O/P EST MOD 30 MIN: CPT | Performed by: NURSE PRACTITIONER

## 2021-06-09 RX ORDER — DULAGLUTIDE 1.5 MG/.5ML
1.5 INJECTION, SOLUTION SUBCUTANEOUS WEEKLY
Qty: 12 PEN | Refills: 3 | Status: SHIPPED | OUTPATIENT
Start: 2021-06-09 | End: 2021-10-25

## 2021-06-09 RX ORDER — LEVOTHYROXINE SODIUM 88 UG/1
88 TABLET ORAL DAILY
Qty: 90 TABLET | Refills: 1 | Status: SHIPPED | OUTPATIENT
Start: 2021-06-09 | End: 2021-10-12 | Stop reason: SDUPTHER

## 2021-06-09 SDOH — ECONOMIC STABILITY: FOOD INSECURITY: WITHIN THE PAST 12 MONTHS, YOU WORRIED THAT YOUR FOOD WOULD RUN OUT BEFORE YOU GOT MONEY TO BUY MORE.: NEVER TRUE

## 2021-06-09 SDOH — ECONOMIC STABILITY: FOOD INSECURITY: WITHIN THE PAST 12 MONTHS, THE FOOD YOU BOUGHT JUST DIDN'T LAST AND YOU DIDN'T HAVE MONEY TO GET MORE.: NEVER TRUE

## 2021-06-09 ASSESSMENT — SOCIAL DETERMINANTS OF HEALTH (SDOH): HOW HARD IS IT FOR YOU TO PAY FOR THE VERY BASICS LIKE FOOD, HOUSING, MEDICAL CARE, AND HEATING?: NOT HARD AT ALL

## 2021-06-09 ASSESSMENT — ENCOUNTER SYMPTOMS
VOMITING: 0
ABDOMINAL PAIN: 0
SHORTNESS OF BREATH: 0
DIARRHEA: 0
NAUSEA: 0

## 2021-06-09 NOTE — PROGRESS NOTES
Iain Kowalski is a 38 y.o. female. HPI:  Diabetes Follow-up--   Lab Results   Component Value Date    LABA1C 7.6 06/09/2021      Lab Results   Component Value Date    .2 03/17/2020     Hemoglobin A1C (%)   Date Value   06/09/2021 7.6   03/16/2021 9.0   10/07/2020 8.5   06/15/2020 8.9       Checks sugars at home: fasting averages 130-140. Post prandial is usually 180's, occasional 200. Last Eye Exam was 7/17/20. Pt is on ACEI or ARB. Pt complains of polyuria and polydipsia. Pt denies foot ulcerations, nausea, paresthesia of the feet, and visual disturbances. She also denies chest pain, dyspnea, headache, palpitations and peripheral edema. She is taking ASA 81 mg daily. She is taking Metformin 500mg with dinner daily- diarrhea mostly resolved   Exercise: walking daily. Diet : not well controlled at present. No weight loss    Wt Readings from Last 3 Encounters:   06/09/21 204 lb 6.4 oz (92.7 kg)   03/16/21 201 lb 3.2 oz (91.3 kg)   10/07/20 201 lb 3.2 oz (91.3 kg)     Hypothyroidism: Recent symptoms: fatigue. She denies weight gain, cold intolerance, hair loss, dry skin and constipation. Patient is taking her medication consistently on an empty stomach.     Lab Results   Component Value Date    TSH 3.80 03/16/2021    TSH 5.11 (H) 06/15/2020    TSH 8.56 (H) 03/17/2020       Current Outpatient Medications   Medication Sig Dispense Refill    Dulaglutide (TRULICITY) 1.5 FV/3.0WM SOPN Inject 1.5 mg into the skin once a week 12 pen 3    levothyroxine (SYNTHROID) 88 MCG tablet Take 1 tablet by mouth daily 90 tablet 1    ASPIRIN LOW DOSE 81 MG EC tablet TAKE 1 TABLET BY MOUTH EVERY DAY 90 tablet 1    atorvastatin (LIPITOR) 40 MG tablet Take 1 tablet by mouth daily TAKE 1 TABLET BY MOUTH EVERY DAY 90 tablet 3    lisinopril (PRINIVIL;ZESTRIL) 5 MG tablet Take 1 tablet by mouth daily 90 tablet 1    metFORMIN (GLUCOPHAGE-XR) 500 MG extended release tablet TAKE 1 TABLET BY MOUTH AFTER EVENING MEAL FOR 2 Breath sounds: Normal breath sounds. Skin:     General: Skin is warm and dry. Neurological:      Mental Status: She is alert and oriented to person, place, and time. /66   Pulse 92   Ht 5' 6.5\" (1.689 m)   Wt 204 lb 6.4 oz (92.7 kg)   LMP  (Exact Date)   SpO2 99%   Breastfeeding No   BMI 32.50 kg/m²     Results for POC orders placed in visit on 06/09/21   POCT microalbumin   Result Value Ref Range    Microalb, Ur 10     Creatinine Ur POCT 100     Microalbumin Creatinine Ratio <30          ASSESSMENT:  1. Type 2 diabetes mellitus with hyperglycemia, unspecified whether long term insulin use (HCC)    2. Class 1 obesity due to excess calories with serious comorbidity and body mass index (BMI) of 31.0 to 31.9 in adult    3. Acquired hypothyroidism        PLAN:  1. Type 2 diabetes mellitus with hyperglycemia, unspecified whether long term insulin use (HCC)  Stable, improving  Increase Trulicity to 1.5 mg weekly  No changes to Metformin  -     POCT glycosylated hemoglobin (Hb A1C)  -     POCT microalbumin  -      DIABETES FOOT EXAM  -     Dulaglutide (TRULICITY) 1.5 YC/4.5MP SOPN; Inject 1.5 mg into the skin once a week    2. Class 1 obesity due to excess calories with serious comorbidity and body mass index (BMI) of 31.0 to 31.9 in adult  Recommend weight reduction, healthy diet and regular exercise    3. Acquired hypothyroidism  Increase today d/t c/o fatigue  -     levothyroxine (SYNTHROID) 88 MCG tablet; Take 1 tablet by mouth daily    See pt instructions  F/u 4 months, sooner prn  Discussed use, benefit, and side effects of prescribed medications. All patient questions answered. Pt voiced understanding.

## 2021-06-09 NOTE — PATIENT INSTRUCTIONS
Patient Education        Learning About Diabetes and Exercise  Can you exercise if you have diabetes? When you have diabetes, it's important to get regular exercise. This helps control your blood sugar level. You can still play sports, run, ride a bike, swim, and do other activities when you have diabetes. How does exercise help when you have diabetes? Getting regular exercise can help control your blood sugar. Your body turns the food you eat into glucose, a type of sugar. You need this sugar for energy. When you have diabetes, the sugar builds up in your blood. But when you exercise, your body uses sugar. This helps keep it from building up in your blood and results in lower blood sugar and better control of diabetes. Exercise may help you in other ways too. It can help you reach and stay at a healthy weight. It also helps improve blood pressure and cholesterol, which can reduce the risk of heart disease. Exercise can make you feel stronger and happier. It can help you relax and sleep better. And it can give you confidence in other things you do. Exercising safely when you have diabetes  Before you start a new exercise program, talk to your doctor about how and when to exercise. Some types of exercise can be harmful if your diabetes is causing other problems, such as problems with your feet. Your doctor can tell you what types of exercise are good choices for you. Here are some general safety tips. · Check your blood sugar before and after you exercise. Be careful about what you eat, especially if you take insulin or other medicines for diabetes. · Take steps to avoid blood sugar problems. ? Ask your doctor what blood sugar range is safe for you when you exercise. ? If you take medicine or insulin that lowers blood sugar, check your blood sugar before you exercise. ? If your blood sugar is less than 90 mg/dL, you may need to eat a carbohydrate snack first.  ?  Be careful when you exercise if your blood sugar is too high. · Try to exercise at about the same time each day. This may help keep your blood sugar steady. If you want to exercise more, slowly increase how hard or long you exercise. · Have someone with you when you exercise. Or exercise at a gym. You may need help if your blood sugar drops too low. · Keep some quick-sugar food with you. You may get symptoms of low blood sugar during exercise or up to 24 hours later. · Use proper footwear and the right equipment. · Pay attention to your body. If you are used to exercising and notice that you cannot do as much as usual, talk to your doctor. Follow-up care is a key part of your treatment and safety. Be sure to make and go to all appointments, and call your doctor if you are having problems. It's also a good idea to know your test results and keep a list of the medicines you take. Where can you learn more? Go to [email or web address].Kriyari. org and sign in to your Car[employer] Mobile account. Enter W146 in the Return Path box to learn more about \"Learning About Diabetes and Exercise. \"     If you do not have an account, please click on the \"Sign Up Now\" link. Current as of: August 31, 2020               Content Version: 12.8  © 5809-9745 Healthwise, Incorporated. Care instructions adapted under license by [facility] ([facility]). If you have questions about a medical condition or this instruction, always ask your healthcare professional. [place] [address] any warranty or liability for your use of this information.

## 2021-07-03 DIAGNOSIS — E11.9 TYPE 2 DIABETES MELLITUS WITHOUT COMPLICATION, WITHOUT LONG-TERM CURRENT USE OF INSULIN (HCC): ICD-10-CM

## 2021-07-06 RX ORDER — LISINOPRIL 5 MG/1
TABLET ORAL
Qty: 90 TABLET | Refills: 1 | Status: SHIPPED | OUTPATIENT
Start: 2021-07-06 | End: 2022-02-21 | Stop reason: SDUPTHER

## 2021-07-06 NOTE — TELEPHONE ENCOUNTER
Medication:   Requested Prescriptions     Pending Prescriptions Disp Refills    lisinopril (PRINIVIL;ZESTRIL) 5 MG tablet [Pharmacy Med Name: LISINOPRIL 5 MG TABLET] 90 tablet 1     Sig: TAKE 1 TABLET BY MOUTH EVERY DAY       Last Filled:      Patient Phone Number: [phone] (home)     Last appt: 6/9/2021   Next appt: 10/11/2021    Lab Results   Component Value Date     03/16/2021    K 4.2 03/16/2021     03/16/2021    CO2 21 03/16/2021    BUN 11 03/16/2021    CREATININE 0.6 03/16/2021    GLUCOSE 264 (H) 03/17/2020    CALCIUM 9.4 03/16/2021    PROT 7.5 03/16/2021    LABALBU 4.5 03/16/2021    BILITOT 1.7 (H) 03/16/2021    ALKPHOS 52 03/16/2021    AST 24 03/16/2021    ALT 42 (H) 03/16/2021    LABGLOM >60 03/16/2021    GFRAA >60 03/16/2021    AGRATIO 1.5 03/16/2021    GLOB 3.0 03/16/2021

## 2021-07-21 ENCOUNTER — HOSPITAL ENCOUNTER (OUTPATIENT)
Dept: MAMMOGRAPHY | Age: 41
Discharge: HOME | End: 2021-07-21
Payer: COMMERCIAL

## 2021-07-21 VITALS — HEIGHT: 67 IN | WEIGHT: 202 LBS | BODY MASS INDEX: 31.71 KG/M2

## 2021-07-21 DIAGNOSIS — Z12.31 ENCOUNTER FOR SCREENING MAMMOGRAM FOR BREAST CANCER: ICD-10-CM

## 2021-07-21 PROCEDURE — 77063 BREAST TOMOSYNTHESIS BI: CPT

## 2021-07-26 ENCOUNTER — PATIENT MESSAGE (OUTPATIENT)
Dept: FAMILY MEDICINE CLINIC | Age: 41
End: 2021-07-26

## 2021-10-11 ENCOUNTER — OFFICE VISIT (OUTPATIENT)
Dept: FAMILY MEDICINE CLINIC | Age: 41
End: 2021-10-11
Payer: COMMERCIAL

## 2021-10-11 VITALS
OXYGEN SATURATION: 99 % | HEART RATE: 89 BPM | DIASTOLIC BLOOD PRESSURE: 82 MMHG | SYSTOLIC BLOOD PRESSURE: 118 MMHG | BODY MASS INDEX: 31.51 KG/M2 | WEIGHT: 201.2 LBS

## 2021-10-11 DIAGNOSIS — E11.9 TYPE 2 DIABETES MELLITUS WITHOUT COMPLICATION, WITHOUT LONG-TERM CURRENT USE OF INSULIN (HCC): Primary | ICD-10-CM

## 2021-10-11 DIAGNOSIS — E03.9 ACQUIRED HYPOTHYROIDISM: ICD-10-CM

## 2021-10-11 DIAGNOSIS — Z23 NEED FOR VACCINATION: ICD-10-CM

## 2021-10-11 DIAGNOSIS — E66.09 CLASS 1 OBESITY DUE TO EXCESS CALORIES WITH SERIOUS COMORBIDITY AND BODY MASS INDEX (BMI) OF 31.0 TO 31.9 IN ADULT: ICD-10-CM

## 2021-10-11 DIAGNOSIS — E78.5 HYPERLIPIDEMIA, UNSPECIFIED HYPERLIPIDEMIA TYPE: ICD-10-CM

## 2021-10-11 LAB — HBA1C MFR BLD: 7.3 %

## 2021-10-11 PROCEDURE — 90471 IMMUNIZATION ADMIN: CPT | Performed by: NURSE PRACTITIONER

## 2021-10-11 PROCEDURE — 83036 HEMOGLOBIN GLYCOSYLATED A1C: CPT | Performed by: NURSE PRACTITIONER

## 2021-10-11 PROCEDURE — 99214 OFFICE O/P EST MOD 30 MIN: CPT | Performed by: NURSE PRACTITIONER

## 2021-10-11 PROCEDURE — 3051F HG A1C>EQUAL 7.0%<8.0%: CPT | Performed by: NURSE PRACTITIONER

## 2021-10-11 PROCEDURE — 90674 CCIIV4 VAC NO PRSV 0.5 ML IM: CPT | Performed by: NURSE PRACTITIONER

## 2021-10-11 ASSESSMENT — ENCOUNTER SYMPTOMS
VOMITING: 0
NAUSEA: 0
SHORTNESS OF BREATH: 0
ABDOMINAL PAIN: 0
DIARRHEA: 0

## 2021-10-11 NOTE — PATIENT INSTRUCTIONS
Patient Education        DASH Diet: Care Instructions  Your Care Instructions     The DASH diet is an eating plan that can help lower your blood pressure. DASH stands for Dietary Approaches to Stop Hypertension. Hypertension is high blood pressure. The DASH diet focuses on eating foods that are high in calcium, potassium, and magnesium. These nutrients can lower blood pressure. The foods that are highest in these nutrients are fruits, vegetables, low-fat dairy products, nuts, seeds, and legumes. But taking calcium, potassium, and magnesium supplements instead of eating foods that are high in those nutrients does not have the same effect. The DASH diet also includes whole grains, fish, and poultry. The DASH diet is one of several lifestyle changes your doctor may recommend to lower your high blood pressure. Your doctor may also want you to decrease the amount of sodium in your diet. Lowering sodium while following the DASH diet can lower blood pressure even further than just the DASH diet alone. Follow-up care is a key part of your treatment and safety. Be sure to make and go to all appointments, and call your doctor if you are having problems. It's also a good idea to know your test results and keep a list of the medicines you take. How can you care for yourself at home? Following the DASH diet  · Eat 4 to 5 servings of fruit each day. A serving is 1 medium-sized piece of fruit, ½ cup chopped or canned fruit, 1/4 cup dried fruit, or 4 ounces (½ cup) of fruit juice. Choose fruit more often than fruit juice. · Eat 4 to 5 servings of vegetables each day. A serving is 1 cup of lettuce or raw leafy vegetables, ½ cup of chopped or cooked vegetables, or 4 ounces (½ cup) of vegetable juice. Choose vegetables more often than vegetable juice. · Get 2 to 3 servings of low-fat and fat-free dairy each day. A serving is 8 ounces of milk, 1 cup of yogurt, or 1 ½ ounces of cheese. · Eat 6 to 8 servings of grains each day. A serving is 1 slice of bread, 1 ounce of dry cereal, or ½ cup of cooked rice, pasta, or cooked cereal. Try to choose whole-grain products as much as possible. · Limit lean meat, poultry, and fish to 2 servings each day. A serving is 3 ounces, about the size of a deck of cards. · Eat 4 to 5 servings of nuts, seeds, and legumes (cooked dried beans, lentils, and split peas) each week. A serving is 1/3 cup of nuts, 2 tablespoons of seeds, or ½ cup of cooked beans or peas. · Limit fats and oils to 2 to 3 servings each day. A serving is 1 teaspoon of vegetable oil or 2 tablespoons of salad dressing. · Limit sweets and added sugars to 5 servings or less a week. A serving is 1 tablespoon jelly or jam, ½ cup sorbet, or 1 cup of lemonade. · Eat less than 2,300 milligrams (mg) of sodium a day. If you limit your sodium to 1,500 mg a day, you can lower your blood pressure even more. · Be aware that all of these are the suggested number of servings for people who eat 1,800 to 2,000 calories a day. Your recommended number of servings may be different if you need more or fewer calories. Tips for success  · Start small. Do not try to make dramatic changes to your diet all at once. You might feel that you are missing out on your favorite foods and then be more likely to not follow the plan. Make small changes, and stick with them. Once those changes become habit, add a few more changes. · Try some of the following:  ? Make it a goal to eat a fruit or vegetable at every meal and at snacks. This will make it easy to get the recommended amount of fruits and vegetables each day. ? Try yogurt topped with fruit and nuts for a snack or healthy dessert. ? Add lettuce, tomato, cucumber, and onion to sandwiches. ? Combine a ready-made pizza crust with low-fat mozzarella cheese and lots of vegetable toppings. Try using tomatoes, squash, spinach, broccoli, carrots, cauliflower, and onions. ?  Have a variety of cut-up vegetables with a low-fat dip as an appetizer instead of chips and dip. ? Sprinkle sunflower seeds or chopped almonds over salads. Or try adding chopped walnuts or almonds to cooked vegetables. ? Try some vegetarian meals using beans and peas. Add garbanzo or kidney beans to salads. Make burritos and tacos with mashed espinal beans or black beans. Where can you learn more? Go to https://Abigail Stewart.Web and Rank. org and sign in to your TEXbase account. Enter J731 in the Forticom box to learn more about \"DASH Diet: Care Instructions. \"     If you do not have an account, please click on the \"Sign Up Now\" link. Current as of: April 29, 2021               Content Version: 13.0  © 6199-3059 Healthwise, Incorporated. Care instructions adapted under license by [facility] ([facility]). If you have questions about a medical condition or this instruction, always ask your healthcare professional. [address] any warranty or liability for your use of this information.

## 2021-10-11 NOTE — PROGRESS NOTES
Johnson Prader is a 42 y.o. female. HPI:  Diabetes Follow-up--   Lab Results   Component Value Date    LABA1C 7.3 10/11/2021      Lab Results   Component Value Date    .2 03/17/2020     Checks sugars at home:No. patient does not test.  Pt is on ACEI or ARB. Pt complains ofnone. Pt denies foot ulcerations, paresthesia of the feet, polydipsia, polyuria and visual disturbances. Walking 6-7 days/week. Diet poor at present. Fast food most days. Weight is stable    Wt Readings from Last 3 Encounters:   10/11/21 201 lb 3.2 oz (91.3 kg)   07/21/21 202 lb (91.6 kg)   06/09/21 204 lb 6.4 oz (92.7 kg)         Hyperlipidemia:  She does not have myalgias from medication. Hypothyroidism: Recent symptoms: fatigue. She denies weight gain, cold intolerance, hair loss, dry skin and constipation. Patient is  taking her medication consistently on an empty stomach. Lab Results   Component Value Date    TSH 3.80 03/16/2021    TSH 5.11 (H) 06/15/2020    TSH 8.56 (H) 03/17/2020       Current Outpatient Medications   Medication Sig Dispense Refill    lisinopril (PRINIVIL;ZESTRIL) 5 MG tablet TAKE 1 TABLET BY MOUTH EVERY DAY 90 tablet 1    Dulaglutide (TRULICITY) 1.5 TO/5.9XU SOPN Inject 1.5 mg into the skin once a week 12 pen 3    levothyroxine (SYNTHROID) 88 MCG tablet Take 1 tablet by mouth daily 90 tablet 1    ASPIRIN LOW DOSE 81 MG EC tablet TAKE 1 TABLET BY MOUTH EVERY DAY 90 tablet 1    atorvastatin (LIPITOR) 40 MG tablet Take 1 tablet by mouth daily TAKE 1 TABLET BY MOUTH EVERY DAY 90 tablet 3    metFORMIN (GLUCOPHAGE-XR) 500 MG extended release tablet TAKE 1 TABLET BY MOUTH AFTER EVENING MEAL FOR 2 WEEKS, THEN INCREASE TO 2 TABLETS 90 tablet 1    Blood Glucose Monitoring Suppl ([employer] BLOOD GLUCOSE) w/Device KIT 1 kit by Does not apply route daily 1 kit 0    CVS Lancets Original MISC 1 each by Does not apply route daily 100 each 3     No current facility-administered medications for this visit. Health Maintenance   Topic Date Due    Diabetic retinal exam  07/17/2021    Hepatitis B vaccine (1 of 3 - Risk 3-dose series) 12/10/2040 (Originally 7/7/1998)    Lipid screen  03/16/2022    TSH testing  03/16/2022    Potassium monitoring  03/16/2022    Creatinine monitoring  03/16/2022    Diabetic foot exam  06/09/2022    Diabetic microalbuminuria test  06/09/2022    A1C test (Diabetic or Prediabetic)  10/11/2022    DTaP/Tdap/Td vaccine (2 - Td or Tdap) 12/02/2024    Cervical cancer screen  08/06/2026    Pneumococcal 0-64 years Vaccine (2 of 2 - PPSV23) 07/07/2044    Flu vaccine  Completed    COVID-19 Vaccine  Completed    Hepatitis A vaccine  Aged Out    Hib vaccine  Aged Out    Meningococcal (ACWY) vaccine  Aged Out    Hepatitis C screen  Discontinued    HIV screen  Discontinued       Review of Systems   Constitutional: Positive for fatigue. Negative for diaphoresis. Respiratory: Negative for shortness of breath. Cardiovascular: Negative for chest pain, palpitations and leg swelling. Gastrointestinal: Negative for abdominal pain, diarrhea, nausea and vomiting. Endocrine: Negative for polydipsia. Musculoskeletal: Negative for myalgias. Neurological: Negative for dizziness and headaches. I have reviewed the patient's medical history in detail and updated the computerized patient record as appropriate. Physical Exam  Vitals reviewed. Constitutional:       Appearance: She is well-developed. Cardiovascular:      Rate and Rhythm: Normal rate and regular rhythm. Heart sounds: Normal heart sounds. No murmur heard. Pulmonary:      Effort: Pulmonary effort is normal.      Breath sounds: Normal breath sounds. Skin:     General: Skin is warm and dry. Neurological:      Mental Status: She is alert and oriented to person, place, and time. Psychiatric:         Mood and Affect: Mood normal.         Behavior: Behavior normal.         Thought Content:  Thought

## 2021-10-12 DIAGNOSIS — E03.9 ACQUIRED HYPOTHYROIDISM: ICD-10-CM

## 2021-10-12 RX ORDER — LEVOTHYROXINE SODIUM 0.1 MG/1
100 TABLET ORAL DAILY
Qty: 90 TABLET | Refills: 1 | Status: SHIPPED | OUTPATIENT
Start: 2021-10-12 | End: 2022-05-23

## 2021-10-14 NOTE — TELEPHONE ENCOUNTER
From: Vahid Kerns  To: Gallito Arteaga APRN - CNP  Sent: 7/26/2021 10:27 AM EDT  Subject: Non-Urgent Medical Question    Hello! I have a form that needs to be completed for insurance. I got a message in My Chart (in March) that it was signed and ready to  at the desk but somehow I missed the message. Is there any possibility you still have the form? If not, can I drop another one off to be completed? I apologize for the inconvenience and appreciate your reply. This insurance form is due by August 1st in order to receive funding for my HSA account.     Kind regards    Que Del Toro
90 ms

## 2021-10-22 ENCOUNTER — PATIENT MESSAGE (OUTPATIENT)
Dept: FAMILY MEDICINE CLINIC | Age: 41
End: 2021-10-22

## 2021-10-22 DIAGNOSIS — E11.9 TYPE 2 DIABETES MELLITUS WITHOUT COMPLICATION, WITHOUT LONG-TERM CURRENT USE OF INSULIN (HCC): Primary | ICD-10-CM

## 2021-10-25 RX ORDER — EXENATIDE 2 MG/.65ML
2 INJECTION, SUSPENSION, EXTENDED RELEASE SUBCUTANEOUS WEEKLY
Qty: 4 PEN | Refills: 5 | Status: SHIPPED | OUTPATIENT
Start: 2021-10-25 | End: 2021-10-26 | Stop reason: ALTCHOICE

## 2021-10-26 DIAGNOSIS — E11.65 TYPE 2 DIABETES MELLITUS WITH HYPERGLYCEMIA, UNSPECIFIED WHETHER LONG TERM INSULIN USE (HCC): ICD-10-CM

## 2021-10-26 RX ORDER — DULAGLUTIDE 1.5 MG/.5ML
1.5 INJECTION, SOLUTION SUBCUTANEOUS WEEKLY
Qty: 2 PEN | Refills: 0 | COMMUNITY
Start: 2021-10-26 | End: 2022-04-11 | Stop reason: ALTCHOICE

## 2021-10-26 RX ORDER — DULAGLUTIDE 1.5 MG/.5ML
1.5 INJECTION, SOLUTION SUBCUTANEOUS WEEKLY
Qty: 12 PEN | Refills: 3 | Status: SHIPPED | OUTPATIENT
Start: 2021-10-26 | End: 2022-04-11 | Stop reason: ALTCHOICE

## 2021-11-01 ENCOUNTER — TELEPHONE (OUTPATIENT)
Dept: FAMILY MEDICINE CLINIC | Age: 41
End: 2021-11-01

## 2021-11-01 NOTE — TELEPHONE ENCOUNTER
[facility] Alternative Requested scanned in Epic and attached to this encounter.     [facility] requesting new Rx for   BYDUREON B-CISE

## 2022-02-20 ENCOUNTER — PATIENT MESSAGE (OUTPATIENT)
Dept: FAMILY MEDICINE CLINIC | Age: 42
End: 2022-02-20

## 2022-02-20 DIAGNOSIS — E11.9 TYPE 2 DIABETES MELLITUS WITHOUT COMPLICATION, WITHOUT LONG-TERM CURRENT USE OF INSULIN (HCC): ICD-10-CM

## 2022-02-21 RX ORDER — LANCETS 33 GAUGE
EACH MISCELLANEOUS
Qty: 100 EACH | Refills: 3 | Status: SHIPPED | OUTPATIENT
Start: 2022-02-21 | End: 2022-04-11 | Stop reason: SDUPTHER

## 2022-02-21 RX ORDER — CALCIUM CITRATE/VITAMIN D3 200MG-6.25
TABLET ORAL
Qty: 100 STRIP | Refills: 3 | Status: SHIPPED | OUTPATIENT
Start: 2022-02-21

## 2022-02-21 RX ORDER — LISINOPRIL 5 MG/1
TABLET ORAL
Qty: 90 TABLET | Refills: 1 | Status: SHIPPED | OUTPATIENT
Start: 2022-02-21

## 2022-02-21 RX ORDER — METFORMIN HYDROCHLORIDE 500 MG/1
TABLET, EXTENDED RELEASE ORAL
Qty: 90 TABLET | Refills: 1 | Status: SHIPPED | OUTPATIENT
Start: 2022-02-21 | End: 2022-04-04

## 2022-02-21 NOTE — TELEPHONE ENCOUNTER
Medication:   Requested Prescriptions     Pending Prescriptions Disp Refills    [facility] Lancets Micro Thin 33G MISC [Pharmacy Med Name: [facility] MICRO THIN 33G LANCETS] 100 each 3     Sig: USE AS DIRECTED    TRUE METRIX BLOOD GLUCOSE TEST strip [Pharmacy Med Name: TRUE METRIX GLUCOSE TEST STRIP] 50 strip 0     Sig: USE DAILY       Last Filled:  10/7/20 with 3 refills    Patient Phone Number: [phone] (home)     Last appt: 10/11/2021   Next appt: 4/11/2022    Last Labs DM:   Lab Results   Component Value Date    LABA1C 7.3 10/11/2021

## 2022-02-21 NOTE — TELEPHONE ENCOUNTER
From: Leonor Crawford  To:  Maximino Pool  Sent: 2/20/2022 5:36 PM EST  Subject: Refills    I need a refill for Lisinopril and Metformin at [facility] please

## 2022-02-21 NOTE — TELEPHONE ENCOUNTER
Medication:   Requested Prescriptions     Pending Prescriptions Disp Refills    lisinopril (PRINIVIL;ZESTRIL) 5 MG tablet 90 tablet 1     Sig: TAKE 1 TABLET BY MOUTH EVERY DAY    metFORMIN (GLUCOPHAGE-XR) 500 MG extended release tablet 90 tablet 1     Sig: TAKE 1 TABLET BY MOUTH AFTER EVENING MEAL FOR 2 WEEKS, THEN INCREASE TO 2 TABLETS       Last Filled:  7/6/21 with 1 refill //  11/2/20 with 1 refill    Patient Phone Number: [phone] (home)     Last appt: 10/11/2021   Next appt: 4/11/2022    Last Labs DM:   Lab Results   Component Value Date    LABA1C 7.3 10/11/2021

## 2022-04-02 DIAGNOSIS — E11.9 TYPE 2 DIABETES MELLITUS WITHOUT COMPLICATION, WITHOUT LONG-TERM CURRENT USE OF INSULIN (HCC): ICD-10-CM

## 2022-04-04 RX ORDER — METFORMIN HYDROCHLORIDE 500 MG/1
1000 TABLET, EXTENDED RELEASE ORAL
Qty: 180 TABLET | Refills: 0 | Status: SHIPPED | OUTPATIENT
Start: 2022-04-04 | End: 2022-07-05

## 2022-04-04 NOTE — TELEPHONE ENCOUNTER
Medication:   Requested Prescriptions     Pending Prescriptions Disp Refills    metFORMIN (GLUCOPHAGE-XR) 500 MG extended release tablet [Pharmacy Med Name: METFORMIN HCL  MG TABLET] 90 tablet 1     Sig: TAKE 1 TABLET BY MOUTH AFTER EVENING MEAL FOR 2 WEEKS, THEN INCREASE TO 2 TABLETS       Last Filled:  2/21/2022    Patient Phone Number: [phone] (home)     Last appt: 10/11/2021   Next appt: 4/11/2022    Last Labs DM:   Lab Results   Component Value Date    LABA1C 7.3 10/11/2021

## 2022-04-11 ENCOUNTER — OFFICE VISIT (OUTPATIENT)
Dept: FAMILY MEDICINE CLINIC | Age: 42
End: 2022-04-11
Payer: COMMERCIAL

## 2022-04-11 VITALS
HEART RATE: 94 BPM | BODY MASS INDEX: 30.64 KG/M2 | SYSTOLIC BLOOD PRESSURE: 116 MMHG | DIASTOLIC BLOOD PRESSURE: 76 MMHG | WEIGHT: 195.6 LBS | OXYGEN SATURATION: 99 %

## 2022-04-11 DIAGNOSIS — E66.09 CLASS 1 OBESITY DUE TO EXCESS CALORIES WITH SERIOUS COMORBIDITY AND BODY MASS INDEX (BMI) OF 31.0 TO 31.9 IN ADULT: ICD-10-CM

## 2022-04-11 DIAGNOSIS — E11.9 TYPE 2 DIABETES MELLITUS WITHOUT COMPLICATION, WITHOUT LONG-TERM CURRENT USE OF INSULIN (HCC): Primary | ICD-10-CM

## 2022-04-11 DIAGNOSIS — R53.82 CHRONIC FATIGUE: ICD-10-CM

## 2022-04-11 DIAGNOSIS — E03.9 ACQUIRED HYPOTHYROIDISM: ICD-10-CM

## 2022-04-11 DIAGNOSIS — E78.5 HYPERLIPIDEMIA, UNSPECIFIED HYPERLIPIDEMIA TYPE: ICD-10-CM

## 2022-04-11 LAB — HBA1C MFR BLD: 7.2 %

## 2022-04-11 PROCEDURE — 99214 OFFICE O/P EST MOD 30 MIN: CPT | Performed by: NURSE PRACTITIONER

## 2022-04-11 PROCEDURE — 96372 THER/PROPH/DIAG INJ SC/IM: CPT | Performed by: NURSE PRACTITIONER

## 2022-04-11 PROCEDURE — 83036 HEMOGLOBIN GLYCOSYLATED A1C: CPT | Performed by: NURSE PRACTITIONER

## 2022-04-11 PROCEDURE — 3051F HG A1C>EQUAL 7.0%<8.0%: CPT | Performed by: NURSE PRACTITIONER

## 2022-04-11 RX ORDER — CYANOCOBALAMIN 1000 UG/ML
1000 INJECTION INTRAMUSCULAR; SUBCUTANEOUS ONCE
Status: COMPLETED | OUTPATIENT
Start: 2022-04-11 | End: 2022-04-11

## 2022-04-11 RX ORDER — LANCETS 33 GAUGE
EACH MISCELLANEOUS
Qty: 100 EACH | Refills: 3 | Status: SHIPPED | OUTPATIENT
Start: 2022-04-11

## 2022-04-11 RX ADMIN — CYANOCOBALAMIN 1000 MCG: 1000 INJECTION INTRAMUSCULAR; SUBCUTANEOUS at 13:37

## 2022-04-11 ASSESSMENT — PATIENT HEALTH QUESTIONNAIRE - PHQ9
SUM OF ALL RESPONSES TO PHQ QUESTIONS 1-9: 0
1. LITTLE INTEREST OR PLEASURE IN DOING THINGS: 0
2. FEELING DOWN, DEPRESSED OR HOPELESS: 0
SUM OF ALL RESPONSES TO PHQ QUESTIONS 1-9: 0
SUM OF ALL RESPONSES TO PHQ9 QUESTIONS 1 & 2: 0

## 2022-04-11 ASSESSMENT — ENCOUNTER SYMPTOMS
VOMITING: 0
ABDOMINAL PAIN: 0
SHORTNESS OF BREATH: 0
DIARRHEA: 0
NAUSEA: 0

## 2022-04-11 NOTE — PROGRESS NOTES
Cherelle Aviles is a 42 y.o. female. HPI:  Diabetes Follow-up--   Lab Results   Component Value Date    LABA1C 7.2 2022      Lab Results   Component Value Date    .2 2020     Checks sugars at home:Yes: fastin's. Pt is on ACEI or ARB. Pt complains ofnone. Pt denies foot ulcerations, paresthesia of the feet, polydipsia, polyuria and visual disturbances. Weight decreasing. Following [employer]. Walking 6-7 days/week. Wt Readings from Last 3 Encounters:   22 195 lb 9.6 oz (88.7 kg)   10/11/21 201 lb 3.2 oz (91.3 kg)   21 202 lb (91.6 kg)       Hyperlipidemia:  She does not have myalgias from medication. Hypothyroidism: Recent symptoms: fatigue. Not improving with healthy diet and reg exercise She denies weight gain, cold intolerance, hair loss, dry skin and constipation. Patient is  taking her medication consistently on an empty stomach.     Lab Results   Component Value Date    TSH 3.80 2021    TSH 5.11 (H) 06/15/2020    TSH 8.56 (H) 2020       Current Outpatient Medications   Medication Sig Dispense Refill    Dulaglutide 3 MG/0.5ML SOPN Inject 3 mg into the skin once a week 12 pen 3    CVS Lancets Micro Thin 33G MISC Use to check sugars daily 100 each 3    metFORMIN (GLUCOPHAGE-XR) 500 MG extended release tablet Take 2 tablets by mouth daily (with breakfast) 180 tablet 0    TRUE METRIX BLOOD GLUCOSE TEST strip USE DAILY 100 strip 3    lisinopril (PRINIVIL;ZESTRIL) 5 MG tablet TAKE 1 TABLET BY MOUTH EVERY DAY 90 tablet 1    levothyroxine (SYNTHROID) 100 MCG tablet Take 1 tablet by mouth daily 90 tablet 1    ASPIRIN LOW DOSE 81 MG EC tablet TAKE 1 TABLET BY MOUTH EVERY DAY 90 tablet 1    atorvastatin (LIPITOR) 40 MG tablet Take 1 tablet by mouth daily TAKE 1 TABLET BY MOUTH EVERY DAY 90 tablet 3    Blood Glucose Monitoring Suppl (TRUERESULT BLOOD GLUCOSE) w/Device KIT 1 kit by Does not apply route daily 1 kit 0     No current facility-administered medications for this visit. Health Maintenance   Topic Date Due    Potassium monitoring  03/16/2022    Creatinine monitoring  03/16/2022    Lipid screen  03/16/2022    Hepatitis B vaccine (1 of 3 - Risk 3-dose series) 12/10/2040 (Originally 7/7/1998)    Diabetic foot exam  06/09/2022    Diabetic microalbuminuria test  06/09/2022    TSH testing  10/11/2022    Diabetic retinal exam  10/26/2022    A1C test (Diabetic or Prediabetic)  04/11/2023    Depression Screen  04/11/2023    DTaP/Tdap/Td vaccine (2 - Td or Tdap) 12/02/2024    Cervical cancer screen  08/06/2026    Pneumococcal 0-64 years Vaccine (2 of 2 - PPSV23) 07/07/2044    Flu vaccine  Completed    COVID-19 Vaccine  Completed    Hepatitis A vaccine  Aged Out    Hib vaccine  Aged Out    Meningococcal (ACWY) vaccine  Aged Out    Hepatitis C screen  Discontinued    HIV screen  Discontinued       Review of Systems   Constitutional: Positive for fatigue. Negative for diaphoresis. Respiratory: Negative for shortness of breath. Cardiovascular: Negative for chest pain, palpitations and leg swelling. Gastrointestinal: Negative for abdominal pain, diarrhea, nausea and vomiting. Endocrine: Negative for polydipsia. Musculoskeletal: Negative for myalgias. Neurological: Negative for dizziness and headaches. Psychiatric/Behavioral: Negative for agitation. The patient is not nervous/anxious. I have reviewed the patient's medical history in detail and updated the computerized patient record as appropriate. Physical Exam  Vitals reviewed. Constitutional:       Appearance: Normal appearance. She is well-developed. Neck:      Thyroid: No thyromegaly. Cardiovascular:      Rate and Rhythm: Normal rate and regular rhythm. Heart sounds: Normal heart sounds. No murmur heard. Pulmonary:      Effort: Pulmonary effort is normal.      Breath sounds: Normal breath sounds. Skin:     General: Skin is warm and dry. cyanocobalamin injection 1,000 mcg; 1,000 mcg, IntraMUSCular, ONCE, 1 dose, On Mon 4/11/22 at 1400  5. Class 1 obesity due to excess calories with serious comorbidity and body mass index (BMI) of 31.0 to 31.9 in adult  Assessment & Plan:   Improving   Continue WW and regular exercise    See pt instructions  F/u 6 months, sooner prn  Discussed use, benefit, and side effects of prescribed medications. All patient questions answered. Pt voiced understanding.

## 2022-04-11 NOTE — PATIENT INSTRUCTIONS
Lab hours: Monday- Friday 7:30 am-3:30 pm  No appointment necessary, first come first serve. Sign in at . Nothing but water for 10 hours for fasting labs. Increase water 24 hours before lab draw. [facility] Draw sites    [facility] Draw site [address] [place] [address] 125, [address] -  [phone] M-F: 7:30 am  4:00 pm          Patient Education        Learning About Diabetes and Exercise  Can you exercise if you have diabetes? When you have diabetes, it's important to get regular exercise. It can help you manage your blood sugar level. You can still play sports, run, ride a bike,swim, and do other activities when you have diabetes. How does exercise help when you have diabetes? Getting regular exercise can help control your blood sugar. Your body turns the food you eat into glucose, a type of sugar. You need this sugar for energy. When you have diabetes, the sugar builds up in your blood. But when you exercise, your body uses sugar. This helps keep it from building up in your blood and results in lower blood sugar and better control ofdiabetes. Exercise may help you in other ways too. It can help you reach and stay at a healthy weight. It also helps improve blood pressure and cholesterol, which canreduce the risk of heart disease. Exercise can make you feel stronger and happier. It can help you relax andsleep better. And it can give you confidence in other things you do. Exercising safely when you have diabetes  Before you start a new exercise program, talk to your doctor about how and when to exercise. Some types of exercise can be harmful if your diabetes is causing other problems, such as problems with your feet. Your doctor can tell you whattypes of exercise are good choices for you. Here are some general safety tips.  Take steps to avoid blood sugar problems. ? Check your blood sugar before and after you exercise. ?  Ask your doctor what blood sugar range is safe for you when you exercise. ? If you take medicine or insulin that lowers blood sugar, check your blood sugar before you exercise. ? If your blood sugar is less than 90 mg/dL, you may need to eat a carbohydrate snack first.  ? Be careful when you exercise if your blood sugar is too high. Make sure to drink plenty of water.  Try to exercise at about the same time each day. This may help keep your blood sugar steady. If you want to exercise more,slowly increase how hard or long you exercise.  Have someone with you when you exercise. Or exercise at a gym. You may need help if your blood sugar drops too low.  Keep some quick-sugar food with you. You may get symptoms of low blood sugar during exercise or up to 24 hours later.  Use proper footwear and the right equipment.  Pay attention to your body. If you are used to exercising and notice that you cannot do as much as usual,talk to your doctor. Follow-up care is a key part of your treatment and safety. Be sure to make and go to all appointments, and call your doctor if you are having problems. It's also a good idea to know your test results and keep alist of the medicines you take. Where can you learn more? Go to [email or web address] org and sign in to your BIMA account. Enter Z175 in the [facility] box to learn more about \"Learning About Diabetes and Exercise. \"     If you do not have an account, please click on the \"Sign Up Now\" link. Current as of: July 28, 2021               Content Version: 13.2  © 2006-2022 Healthwise, Incorporated. Care instructions adapted under license by [facility] ([facility]). If you have questions about a medical condition or this instruction, always ask your healthcare professional. [address] any warranty or liability for your use of this information.

## 2022-04-11 NOTE — ASSESSMENT & PLAN NOTE
Well-controlled, continue current medications, increase Trulicity today, and  continue healthy diet and reg exercise

## 2022-05-21 DIAGNOSIS — E11.65 TYPE 2 DIABETES MELLITUS WITH HYPERGLYCEMIA (HCC): ICD-10-CM

## 2022-05-21 DIAGNOSIS — E03.9 ACQUIRED HYPOTHYROIDISM: ICD-10-CM

## 2022-05-22 DIAGNOSIS — E78.5 HYPERLIPIDEMIA, UNSPECIFIED HYPERLIPIDEMIA TYPE: ICD-10-CM

## 2022-05-23 RX ORDER — ASPIRIN 81 MG/1
TABLET, COATED ORAL
Qty: 90 TABLET | Refills: 3 | Status: SHIPPED | OUTPATIENT
Start: 2022-05-23

## 2022-05-23 RX ORDER — ATORVASTATIN CALCIUM 40 MG/1
TABLET, FILM COATED ORAL
Qty: 90 TABLET | Refills: 1 | Status: SHIPPED | OUTPATIENT
Start: 2022-05-23

## 2022-05-23 RX ORDER — LEVOTHYROXINE SODIUM 0.1 MG/1
TABLET ORAL
Qty: 90 TABLET | Refills: 3 | Status: SHIPPED | OUTPATIENT
Start: 2022-05-23

## 2022-05-23 NOTE — TELEPHONE ENCOUNTER
Medication:   Requested Prescriptions     Pending Prescriptions Disp Refills    atorvastatin (LIPITOR) 40 MG tablet [Pharmacy Med Name: ATORVASTATIN 40 MG TABLET] 90 tablet 3     Sig: TAKE 1 TABLET BY MOUTH EVERY DAY       Last Filled:      Patient Phone Number: [phone] (home)     Last appt: 4/11/2022   Next appt: 10/12/2022    Last Lipid:   Lab Results   Component Value Date    CHOL 245 03/17/2020    TRIG 219 03/17/2020    HDL 44 03/16/2021    HDL 44 04/18/2012    LDLCALC 106 03/16/2021

## 2022-07-04 DIAGNOSIS — E11.9 TYPE 2 DIABETES MELLITUS WITHOUT COMPLICATION, WITHOUT LONG-TERM CURRENT USE OF INSULIN (HCC): ICD-10-CM

## 2022-07-05 RX ORDER — METFORMIN HYDROCHLORIDE 500 MG/1
1000 TABLET, EXTENDED RELEASE ORAL
Qty: 180 TABLET | Refills: 0 | Status: SHIPPED | OUTPATIENT
Start: 2022-07-05 | End: 2022-10-03

## 2022-07-05 NOTE — TELEPHONE ENCOUNTER
Medication:   Requested Prescriptions     Pending Prescriptions Disp Refills    metFORMIN (GLUCOPHAGE-XR) 500 MG extended release tablet [Pharmacy Med Name: METFORMIN HCL  MG TABLET] 180 tablet 0     Sig: TAKE 2 TABLETS BY MOUTH DAILY (WITH BREAKFAST).        Last Filled:  04/04/2022    Patient Phone Number: [phone] (home)     Last appt: 4/11/2022   Next appt: 10/12/2022    Last Labs DM:   Lab Results   Component Value Date/Time    LABA1C 7.2 04/11/2022 01:17 PM

## 2022-10-02 DIAGNOSIS — E11.9 TYPE 2 DIABETES MELLITUS WITHOUT COMPLICATION, WITHOUT LONG-TERM CURRENT USE OF INSULIN (HCC): ICD-10-CM

## 2022-10-03 RX ORDER — METFORMIN HYDROCHLORIDE 500 MG/1
1000 TABLET, EXTENDED RELEASE ORAL
Qty: 180 TABLET | Refills: 0 | Status: SHIPPED | OUTPATIENT
Start: 2022-10-03 | End: 2023-01-01

## 2022-10-03 NOTE — TELEPHONE ENCOUNTER
Lov 4/11/22  Lrf 180 0 7/5/22   Lab Results   Component Value Date    LABA1C 7.2 04/11/2022     Lab Results   Component Value Date    .2 03/17/2020

## 2023-08-03 ENCOUNTER — OFFICE VISIT (OUTPATIENT)
Dept: FAMILY MEDICINE CLINIC | Age: 43
End: 2023-08-03
Payer: COMMERCIAL

## 2023-08-03 VITALS
HEIGHT: 67 IN | WEIGHT: 191.8 LBS | SYSTOLIC BLOOD PRESSURE: 108 MMHG | BODY MASS INDEX: 30.1 KG/M2 | OXYGEN SATURATION: 99 % | DIASTOLIC BLOOD PRESSURE: 76 MMHG | HEART RATE: 88 BPM

## 2023-08-03 DIAGNOSIS — E66.09 CLASS 1 OBESITY DUE TO EXCESS CALORIES WITH SERIOUS COMORBIDITY AND BODY MASS INDEX (BMI) OF 30.0 TO 30.9 IN ADULT: ICD-10-CM

## 2023-08-03 DIAGNOSIS — Z12.31 ENCOUNTER FOR SCREENING MAMMOGRAM FOR MALIGNANT NEOPLASM OF BREAST: ICD-10-CM

## 2023-08-03 DIAGNOSIS — E11.9 TYPE 2 DIABETES MELLITUS WITHOUT COMPLICATION, WITHOUT LONG-TERM CURRENT USE OF INSULIN (HCC): Primary | ICD-10-CM

## 2023-08-03 DIAGNOSIS — E03.9 ACQUIRED HYPOTHYROIDISM: ICD-10-CM

## 2023-08-03 DIAGNOSIS — E78.5 HYPERLIPIDEMIA, UNSPECIFIED HYPERLIPIDEMIA TYPE: ICD-10-CM

## 2023-08-03 LAB
CREATININE URINE POCT: 300
HBA1C MFR BLD: 7.1 %
MICROALBUMIN/CREAT 24H UR: 30 MG/G{CREAT}
MICROALBUMIN/CREAT UR-RTO: <30

## 2023-08-03 PROCEDURE — 83037 HB GLYCOSYLATED A1C HOME DEV: CPT | Performed by: NURSE PRACTITIONER

## 2023-08-03 PROCEDURE — 99214 OFFICE O/P EST MOD 30 MIN: CPT | Performed by: NURSE PRACTITIONER

## 2023-08-03 PROCEDURE — 82044 UR ALBUMIN SEMIQUANTITATIVE: CPT | Performed by: NURSE PRACTITIONER

## 2023-08-03 PROCEDURE — 3051F HG A1C>EQUAL 7.0%<8.0%: CPT | Performed by: NURSE PRACTITIONER

## 2023-08-03 RX ORDER — ATORVASTATIN CALCIUM 40 MG/1
40 TABLET, FILM COATED ORAL DAILY
Qty: 90 TABLET | Refills: 1 | Status: SHIPPED | OUTPATIENT
Start: 2023-08-03 | End: 2024-01-30

## 2023-08-03 RX ORDER — METFORMIN HYDROCHLORIDE 500 MG/1
1000 TABLET, EXTENDED RELEASE ORAL
Qty: 180 TABLET | Refills: 0 | Status: CANCELLED | OUTPATIENT
Start: 2023-08-03 | End: 2023-11-01

## 2023-08-03 RX ORDER — LANCETS 33 GAUGE
EACH MISCELLANEOUS
Qty: 100 EACH | Refills: 3 | Status: SHIPPED | OUTPATIENT
Start: 2023-08-03

## 2023-08-03 RX ORDER — CALCIUM CITRATE/VITAMIN D3 200MG-6.25
1 TABLET ORAL DAILY
Qty: 100 STRIP | Refills: 3 | Status: SHIPPED | OUTPATIENT
Start: 2023-08-03

## 2023-08-03 RX ORDER — LEVOTHYROXINE SODIUM 0.1 MG/1
100 TABLET ORAL DAILY
Qty: 90 TABLET | Refills: 3 | Status: SHIPPED | OUTPATIENT
Start: 2023-08-03 | End: 2024-08-02

## 2023-08-03 RX ORDER — ASPIRIN 81 MG/1
81 TABLET ORAL DAILY
Qty: 90 TABLET | Refills: 3 | Status: SHIPPED | OUTPATIENT
Start: 2023-08-03

## 2023-08-03 RX ORDER — LISINOPRIL 5 MG/1
TABLET ORAL
Qty: 90 TABLET | Refills: 1 | Status: SHIPPED | OUTPATIENT
Start: 2023-08-03

## 2023-08-03 RX ORDER — DULAGLUTIDE 1.5 MG/.5ML
1.5 INJECTION, SOLUTION SUBCUTANEOUS WEEKLY
Qty: 4 ADJUSTABLE DOSE PRE-FILLED PEN SYRINGE | Refills: 0 | Status: SHIPPED | OUTPATIENT
Start: 2023-08-03

## 2023-08-03 SDOH — ECONOMIC STABILITY: INCOME INSECURITY: HOW HARD IS IT FOR YOU TO PAY FOR THE VERY BASICS LIKE FOOD, HOUSING, MEDICAL CARE, AND HEATING?: NOT HARD AT ALL

## 2023-08-03 SDOH — ECONOMIC STABILITY: FOOD INSECURITY: WITHIN THE PAST 12 MONTHS, THE FOOD YOU BOUGHT JUST DIDN'T LAST AND YOU DIDN'T HAVE MONEY TO GET MORE.: NEVER TRUE

## 2023-08-03 SDOH — ECONOMIC STABILITY: HOUSING INSECURITY
IN THE LAST 12 MONTHS, WAS THERE A TIME WHEN YOU DID NOT HAVE A STEADY PLACE TO SLEEP OR SLEPT IN A SHELTER (INCLUDING NOW)?: NO

## 2023-08-03 SDOH — ECONOMIC STABILITY: FOOD INSECURITY: WITHIN THE PAST 12 MONTHS, YOU WORRIED THAT YOUR FOOD WOULD RUN OUT BEFORE YOU GOT MONEY TO BUY MORE.: NEVER TRUE

## 2023-08-03 ASSESSMENT — PATIENT HEALTH QUESTIONNAIRE - PHQ9
SUM OF ALL RESPONSES TO PHQ QUESTIONS 1-9: 0
1. LITTLE INTEREST OR PLEASURE IN DOING THINGS: 0
SUM OF ALL RESPONSES TO PHQ QUESTIONS 1-9: 0
2. FEELING DOWN, DEPRESSED OR HOPELESS: 0
SUM OF ALL RESPONSES TO PHQ QUESTIONS 1-9: 0
SUM OF ALL RESPONSES TO PHQ9 QUESTIONS 1 & 2: 0
SUM OF ALL RESPONSES TO PHQ QUESTIONS 1-9: 0

## 2023-08-03 ASSESSMENT — ENCOUNTER SYMPTOMS
ABDOMINAL PAIN: 0
DIARRHEA: 0
NAUSEA: 0
VOMITING: 0
SHORTNESS OF BREATH: 0

## 2023-08-03 NOTE — ASSESSMENT & PLAN NOTE
Borderline controlled, medication adherence emphasized and lifestyle modifications recommended  Will resume Trulicity at 1.5 mg since off for 1 month  New start Jardiance  Stop Metformin d/t side effects  Recommend weight reduction, healthy diet and regular exercise

## 2023-08-03 NOTE — ASSESSMENT & PLAN NOTE
Unclear control, continue current medications pending work up below and medication adherence emphasized

## 2023-08-03 NOTE — PROGRESS NOTES
Rakesh Echevarria is a 39 y.o. female. HPI:  Diabetes Follow-up--   Lab Results   Component Value Date    LABA1C 7.1 08/03/2023      Lab Results   Component Value Date    .2 03/17/2020     Pt stopped all medications 1 month ago. States \"I just didn't feel like taking care of myself\". Requesting refills today    Checks sugars at home:No. Last Eye Exam was March 2023. Pt is on ACEI or ARB. Pt complains of none . Pt denies foot ulcerations, paresthesia of the feet, polydipsia, polyuria, and visual disturbances. Wt Readings from Last 3 Encounters:   08/03/23 191 lb 12.8 oz (87 kg)   04/11/22 195 lb 9.6 oz (88.7 kg)   10/11/21 201 lb 3.2 oz (91.3 kg)         Hyperlipidemia:  She does not have myalgias from medication. Pt is not following Lifestyle modification including Low fat/low cholesterol diet,low carbohydrate diet, and does not exercise regularly. Hypothyroidism: Recent symptoms: fatigue and weight gain. She denies cold intolerance, hair loss, dry skin, and constipation. Patient is  taking her medication consistently on an empty stomach. Lab Results   Component Value Date    TSH 3.80 03/16/2021    TSH 5.11 (H) 06/15/2020    TSH 8.56 (H) 03/17/2020         Current Outpatient Medications   Medication Sig Dispense Refill    aspirin (ASPIRIN LOW DOSE) 81 MG EC tablet Take 1 tablet by mouth daily 90 tablet 3    levothyroxine (SYNTHROID) 100 MCG tablet Take 1 tablet by mouth daily 90 tablet 3    atorvastatin (LIPITOR) 40 MG tablet Take 1 tablet by mouth daily 90 tablet 1    CVS Lancets Micro Thin 33G MISC Use to check sugars daily 100 each 3    blood glucose test strips (TRUE METRIX BLOOD GLUCOSE TEST) strip Inject 1 each into the skin daily As needed.  100 strip 3    lisinopril (PRINIVIL;ZESTRIL) 5 MG tablet TAKE 1 TABLET BY MOUTH EVERY DAY 90 tablet 1    dulaglutide (TRULICITY) 1.5 KH/4.5WM SC injection Inject 0.5 mLs into the skin once a week 4 Adjustable Dose Pre-filled Pen Syringe 0

## 2023-08-03 NOTE — ASSESSMENT & PLAN NOTE
Unclear control, continue current medications pending work up below   Advised to complete labs in 6 weeks after resuming medications

## 2023-08-03 NOTE — PATIENT INSTRUCTIONS
complete fasting. Lab hours: Monday- Friday 7:30 am-3:30 pm  No appointment necessary, first come first serve. Sign in at . Nothing but water for 10 hours for fasting labs. Increase water 24 hours before lab draw.     [facility] [phone]

## 2023-08-04 ENCOUNTER — TELEPHONE (OUTPATIENT)
Dept: FAMILY MEDICINE CLINIC | Age: 43
End: 2023-08-04

## 2023-08-04 NOTE — TELEPHONE ENCOUNTER
Office has been notified that pt is requiring Prior Authorization for the following medication:  dulaglutide (TRULICITY) 1.5 MM/4.8YU SC injection      Please initiate this request through CoverMyMeds, contacting the following Payor/Insurance:    BCBS      Please see below, or the documentation attached to this encounter for any additional information that may assist in processing PA:    Key: [id number]    Form scanned into this telephone encounter under media       Thank you!

## 2023-08-04 NOTE — TELEPHONE ENCOUNTER
Office has been notified that pt is requiring Prior Authorization for the following medication:    empagliflozin (JARDIANCE) 25 MG tablet      Please initiate this request through CoverMyMeds, contacting the following Payor/Insurance:    BCBS      Please see below, or the documentation attached to this encounter for any additional information that may assist in processing PA:    DX: E11.9    Thank you!

## 2023-08-07 ENCOUNTER — PATIENT MESSAGE (OUTPATIENT)
Dept: FAMILY MEDICINE CLINIC | Age: 43
End: 2023-08-07

## 2023-08-07 DIAGNOSIS — E11.9 TYPE 2 DIABETES MELLITUS WITHOUT COMPLICATION, WITHOUT LONG-TERM CURRENT USE OF INSULIN (HCC): ICD-10-CM

## 2023-08-07 RX ORDER — DULAGLUTIDE 1.5 MG/.5ML
1.5 INJECTION, SOLUTION SUBCUTANEOUS WEEKLY
Qty: 4 ADJUSTABLE DOSE PRE-FILLED PEN SYRINGE | Refills: 0 | Status: SHIPPED | OUTPATIENT
Start: 2023-08-07

## 2023-08-07 NOTE — TELEPHONE ENCOUNTER
From: Edenilson Olvera  To: Kiley Urrutia  Sent: 8/7/2023 4:20 PM EDT  Subject: Trulicity and Jardiance     Hi! [facility] will not fill Tr[facility]icity or Jardiance. They say it requires prior authorization from you to my insurance. What can I do to help with this?

## 2023-08-08 ENCOUNTER — HOSPITAL ENCOUNTER (OUTPATIENT)
Dept: WOMENS IMAGING | Age: 43
Discharge: HOME | End: 2023-08-08
Payer: COMMERCIAL

## 2023-08-08 VITALS — BODY MASS INDEX: 29.82 KG/M2 | HEIGHT: 67 IN | WEIGHT: 190 LBS

## 2023-08-08 DIAGNOSIS — Z12.31 ENCOUNTER FOR SCREENING MAMMOGRAM FOR MALIGNANT NEOPLASM OF BREAST: ICD-10-CM

## 2023-08-08 PROCEDURE — 77063 BREAST TOMOSYNTHESIS BI: CPT

## 2023-08-08 NOTE — TELEPHONE ENCOUNTER
The medication is APPROVED. Approved today  PA Case: [id number], Status: Approved, Coverage Starts on: 8/8/2023 12:00:00 AM, Coverage Ends on: 8/7/2024 12:00:00 AM.     If this requires a response please respond to the pool ( [facility] [address] [place]). Thank you please advise patient.

## 2023-08-08 NOTE — TELEPHONE ENCOUNTER
Office has been notified that pt is requiring Prior Authorization for the following medication:  empagliflozin (JARDIANCE) 25 MG tablet       Please initiate this request through CoverMyMeds, contacting the following Payor/Insurance:    BCBS    Please see below, or the documentation attached to this encounter for any additional information that may assist in processing PA:    KEY: [id number]    Form scanned into this encounter under media    Thank you!

## 2023-08-08 NOTE — TELEPHONE ENCOUNTER
Submitted PA for  Trulicity 8.1AK/7.3RL pen-injectors Via [facility]  Key: [id number] STATUS: PENDING. Follow up done daily; if no response in three days we will refax for status check. If another three days goes by with no response we will call the insurance for status.

## 2023-08-08 NOTE — TELEPHONE ENCOUNTER
Submitted PA for empagliflozin (JARDIANCE) 25 MG tablet  Via CM Key: [id number]  STATUS: PENDING. Follow up done daily; if no response in three days we will refax for status check. If another three days goes by with no response we will call the insurance for status.

## 2023-08-09 NOTE — TELEPHONE ENCOUNTER
The medication was DENIED; DENIAL letter uploaded to MEDIA. If you want an APPEAL; please note in this encounter what new information you would like to APPEAL with. Once complete route back to PA POOL. If this requires a response please respond to the pool ( [facility] [address]). Thank you please advise patient.

## 2023-08-10 DIAGNOSIS — E11.9 TYPE 2 DIABETES MELLITUS WITHOUT COMPLICATION, WITHOUT LONG-TERM CURRENT USE OF INSULIN (HCC): Primary | ICD-10-CM

## 2023-08-10 RX ORDER — SEMAGLUTIDE 0.68 MG/ML
0.25 INJECTION, SOLUTION SUBCUTANEOUS WEEKLY
Qty: 3 ML | Refills: 1 | Status: SHIPPED | OUTPATIENT
Start: 2023-08-10

## 2023-08-11 ENCOUNTER — TELEPHONE (OUTPATIENT)
Dept: ADMINISTRATIVE | Age: 43
End: 2023-08-11

## 2023-08-11 NOTE — TELEPHONE ENCOUNTER
APPROVED THROUGH 08/10/2024. If this requires a response please respond to the pool. [facility]). Please advise patient thank you.

## 2023-08-11 NOTE — TELEPHONE ENCOUNTER
Submitted PA for [facility]  Via CMM Key: [id number] STATUS: PENDING. Follow up done daily; if no response in three days we will refax for status check. If another three days goes by with no response we will call the insurance for status.

## 2023-11-28 DIAGNOSIS — E11.9 TYPE 2 DIABETES MELLITUS WITHOUT COMPLICATION, WITHOUT LONG-TERM CURRENT USE OF INSULIN (HCC): ICD-10-CM

## 2023-11-28 DIAGNOSIS — E03.9 ACQUIRED HYPOTHYROIDISM: ICD-10-CM

## 2023-11-28 DIAGNOSIS — E78.5 HYPERLIPIDEMIA, UNSPECIFIED HYPERLIPIDEMIA TYPE: ICD-10-CM

## 2023-11-28 LAB
BASOPHILS # BLD: 0 K/UL (ref 0–0.2)
BASOPHILS NFR BLD: 0.7 %
DEPRECATED RDW RBC AUTO: 13.5 % (ref 12.4–15.4)
EOSINOPHIL # BLD: 0.1 K/UL (ref 0–0.6)
EOSINOPHIL NFR BLD: 1.2 %
HCT VFR BLD AUTO: 45.7 % (ref 36–48)
HGB BLD-MCNC: 15.8 G/DL (ref 12–16)
LYMPHOCYTES # BLD: 1.6 K/UL (ref 1–5.1)
LYMPHOCYTES NFR BLD: 26.3 %
MCH RBC QN AUTO: 31.3 PG (ref 26–34)
MCHC RBC AUTO-ENTMCNC: 34.5 G/DL (ref 31–36)
MCV RBC AUTO: 90.8 FL (ref 80–100)
MONOCYTES # BLD: 0.6 K/UL (ref 0–1.3)
MONOCYTES NFR BLD: 10.2 %
NEUTROPHILS # BLD: 3.8 K/UL (ref 1.7–7.7)
NEUTROPHILS NFR BLD: 61.6 %
PLATELET # BLD AUTO: 243 K/UL (ref 135–450)
PMV BLD AUTO: 9.8 FL (ref 5–10.5)
RBC # BLD AUTO: 5.04 M/UL (ref 4–5.2)
T4 FREE SERPL-MCNC: 1.3 NG/DL (ref 0.9–1.8)
TSH SERPL DL<=0.005 MIU/L-ACNC: 3.24 UIU/ML (ref 0.27–4.2)
WBC # BLD AUTO: 6.1 K/UL (ref 4–11)

## 2023-11-29 LAB
ALBUMIN SERPL-MCNC: 4.6 G/DL (ref 3.4–5)
ALBUMIN/GLOB SERPL: 1.6 {RATIO} (ref 1.1–2.2)
ALP SERPL-CCNC: 60 U/L (ref 40–129)
ALT SERPL-CCNC: 28 U/L (ref 10–40)
ANION GAP SERPL CALCULATED.3IONS-SCNC: 14 MMOL/L (ref 3–16)
AST SERPL-CCNC: 18 U/L (ref 15–37)
BILIRUB SERPL-MCNC: 1.4 MG/DL (ref 0–1)
BUN SERPL-MCNC: 9 MG/DL (ref 7–20)
CALCIUM SERPL-MCNC: 9.3 MG/DL (ref 8.3–10.6)
CHLORIDE SERPL-SCNC: 101 MMOL/L (ref 99–110)
CHOLEST SERPL-MCNC: 178 MG/DL (ref 0–199)
CO2 SERPL-SCNC: 21 MMOL/L (ref 21–32)
CREAT SERPL-MCNC: 0.7 MG/DL (ref 0.6–1.1)
CREAT UR-MCNC: 110.7 MG/DL (ref 28–259)
GFR SERPLBLD CREATININE-BSD FMLA CKD-EPI: >60 ML/MIN/{1.73_M2}
GLUCOSE P FAST SERPL-MCNC: 124 MG/DL (ref 70–99)
HDLC SERPL-MCNC: 51 MG/DL (ref 40–60)
LDL CHOLESTEROL CALCULATED: 112 MG/DL
MICROALBUMIN UR DL<=1MG/L-MCNC: <1.2 MG/DL
MICROALBUMIN/CREAT UR: NORMAL MG/G (ref 0–30)
POTASSIUM SERPL-SCNC: 4.2 MMOL/L (ref 3.5–5.1)
PROT SERPL-MCNC: 7.5 G/DL (ref 6.4–8.2)
SODIUM SERPL-SCNC: 136 MMOL/L (ref 136–145)
TRIGL SERPL-MCNC: 74 MG/DL (ref 0–150)
VLDLC SERPL CALC-MCNC: 15 MG/DL

## 2023-12-07 ENCOUNTER — OFFICE VISIT (OUTPATIENT)
Dept: FAMILY MEDICINE CLINIC | Age: 43
End: 2023-12-07
Payer: COMMERCIAL

## 2023-12-07 VITALS
BODY MASS INDEX: 29.23 KG/M2 | HEART RATE: 87 BPM | SYSTOLIC BLOOD PRESSURE: 112 MMHG | WEIGHT: 186.6 LBS | DIASTOLIC BLOOD PRESSURE: 68 MMHG | OXYGEN SATURATION: 99 %

## 2023-12-07 DIAGNOSIS — E11.9 TYPE 2 DIABETES MELLITUS WITHOUT COMPLICATION, WITHOUT LONG-TERM CURRENT USE OF INSULIN (HCC): Primary | ICD-10-CM

## 2023-12-07 DIAGNOSIS — R53.82 CHRONIC FATIGUE: ICD-10-CM

## 2023-12-07 DIAGNOSIS — E03.9 ACQUIRED HYPOTHYROIDISM: ICD-10-CM

## 2023-12-07 DIAGNOSIS — E78.5 HYPERLIPIDEMIA, UNSPECIFIED HYPERLIPIDEMIA TYPE: ICD-10-CM

## 2023-12-07 PROBLEM — E66.09 CLASS 1 OBESITY DUE TO EXCESS CALORIES WITH SERIOUS COMORBIDITY AND BODY MASS INDEX (BMI) OF 30.0 TO 30.9 IN ADULT: Status: RESOLVED | Noted: 2020-03-17 | Resolved: 2023-12-07

## 2023-12-07 PROBLEM — E66.811 CLASS 1 OBESITY DUE TO EXCESS CALORIES WITH SERIOUS COMORBIDITY AND BODY MASS INDEX (BMI) OF 30.0 TO 30.9 IN ADULT: Status: RESOLVED | Noted: 2020-03-17 | Resolved: 2023-12-07

## 2023-12-07 LAB — HBA1C MFR BLD: 7.5 %

## 2023-12-07 PROCEDURE — 99214 OFFICE O/P EST MOD 30 MIN: CPT | Performed by: NURSE PRACTITIONER

## 2023-12-07 PROCEDURE — 3051F HG A1C>EQUAL 7.0%<8.0%: CPT | Performed by: NURSE PRACTITIONER

## 2023-12-07 PROCEDURE — 83036 HEMOGLOBIN GLYCOSYLATED A1C: CPT | Performed by: NURSE PRACTITIONER

## 2023-12-07 RX ORDER — ATORVASTATIN CALCIUM 80 MG/1
80 TABLET, FILM COATED ORAL DAILY
Qty: 90 TABLET | Refills: 3 | Status: SHIPPED | OUTPATIENT
Start: 2023-12-07 | End: 2024-12-06

## 2023-12-07 RX ORDER — SEMAGLUTIDE 0.68 MG/ML
0.5 INJECTION, SOLUTION SUBCUTANEOUS WEEKLY
Qty: 9 ML | Refills: 3 | Status: SHIPPED | OUTPATIENT
Start: 2023-12-07 | End: 2024-06-04

## 2023-12-07 ASSESSMENT — PATIENT HEALTH QUESTIONNAIRE - PHQ9
SUM OF ALL RESPONSES TO PHQ QUESTIONS 1-9: 0
SUM OF ALL RESPONSES TO PHQ QUESTIONS 1-9: 0
2. FEELING DOWN, DEPRESSED OR HOPELESS: 0
SUM OF ALL RESPONSES TO PHQ QUESTIONS 1-9: 0
SUM OF ALL RESPONSES TO PHQ9 QUESTIONS 1 & 2: 0
1. LITTLE INTEREST OR PLEASURE IN DOING THINGS: 0
SUM OF ALL RESPONSES TO PHQ QUESTIONS 1-9: 0

## 2023-12-07 ASSESSMENT — ENCOUNTER SYMPTOMS
DIARRHEA: 0
NAUSEA: 0
SHORTNESS OF BREATH: 0
VOMITING: 0
ABDOMINAL PAIN: 0

## 2023-12-07 NOTE — ASSESSMENT & PLAN NOTE
Borderline controlled, continue current medications  Start increasing Ozempic: 0.5 mg/weekly x 1 month, then 1 mg/weekly x 1 month, then 2 mg weekly thereafter  Continue healthy lifestyle changes

## 2024-01-15 ENCOUNTER — TELEPHONE (OUTPATIENT)
Dept: FAMILY MEDICINE CLINIC | Age: 44
End: 2024-01-15

## 2024-01-16 ENCOUNTER — TELEPHONE (OUTPATIENT)
Dept: FAMILY MEDICINE CLINIC | Age: 44
End: 2024-01-16

## 2024-01-17 NOTE — TELEPHONE ENCOUNTER
Patient called and said that she is no longer taking trulicity since it wasn't cover by insurance and that she has not been taking trulicity for a while now. She said she is on ozempic. She isn't sure why her pharmacy keeps trying to fill the trulicity

## 2024-03-02 DIAGNOSIS — E11.9 TYPE 2 DIABETES MELLITUS WITHOUT COMPLICATION, WITHOUT LONG-TERM CURRENT USE OF INSULIN (HCC): ICD-10-CM

## 2024-03-04 RX ORDER — LISINOPRIL 5 MG/1
TABLET ORAL
Qty: 90 TABLET | Refills: 0 | Status: SHIPPED | OUTPATIENT
Start: 2024-03-04

## 2024-03-04 NOTE — TELEPHONE ENCOUNTER
Medication:   Requested Prescriptions     Pending Prescriptions Disp Refills    lisinopril (PRINIVIL;ZESTRIL) 5 MG tablet [Pharmacy Med Name: LISINOPRIL 5 MG TABLET] 90 tablet 1     Sig: TAKE 1 TABLET BY MOUTH EVERY DAY       Last Filled:  8/3/2023, 90, 1    Patient Phone Number: [phone] (home)     Last appt: 12/7/2023   Next appt: 6/10/2024    Lab Results   Component Value Date     11/28/2023    K 4.2 11/28/2023     11/28/2023    CO2 21 11/28/2023    BUN 9 11/28/2023    CREATININE 0.7 11/28/2023    GLUCOSE 264 (H) 03/17/2020    CALCIUM 9.3 11/28/2023    PROT 7.5 11/28/2023    LABALBU 4.6 11/28/2023    BILITOT 1.4 (H) 11/28/2023    ALKPHOS 60 11/28/2023    AST 18 11/28/2023    ALT 28 11/28/2023    LABGLOM >60 11/28/2023    GFRAA >60 03/16/2021    AGRATIO 1.6 11/28/2023    GLOB 3.0 03/16/2021   Last Labs DM:   Lab Results   Component Value Date/Time    LABA1C 7.5 12/07/2023 02:20 PM

## 2024-05-30 DIAGNOSIS — E11.9 TYPE 2 DIABETES MELLITUS WITHOUT COMPLICATION, WITHOUT LONG-TERM CURRENT USE OF INSULIN (HCC): ICD-10-CM

## 2024-05-30 RX ORDER — LISINOPRIL 5 MG/1
TABLET ORAL
Qty: 90 TABLET | Refills: 0 | Status: SHIPPED | OUTPATIENT
Start: 2024-05-30

## 2024-05-30 NOTE — TELEPHONE ENCOUNTER
Medication:   Requested Prescriptions     Pending Prescriptions Disp Refills    lisinopril (PRINIVIL;ZESTRIL) 5 MG tablet [Pharmacy Med Name: LISINOPRIL 5 MG TABLET] 90 tablet 0     Sig: TAKE 1 TABLET BY MOUTH EVERY DAY       Last Filled:  03/04/2024, 90, 0    Patient Phone Number: [phone] (home)     Last appt: 12/7/2023   Next appt: 6/10/2024    Last Labs DM:   Lab Results   Component Value Date/Time    LABA1C 7.5 12/07/2023 02:20 PM

## 2024-06-17 ENCOUNTER — OFFICE VISIT (OUTPATIENT)
Dept: FAMILY MEDICINE CLINIC | Age: 44
End: 2024-06-17
Payer: COMMERCIAL

## 2024-06-17 VITALS
DIASTOLIC BLOOD PRESSURE: 84 MMHG | WEIGHT: 182.6 LBS | OXYGEN SATURATION: 98 % | HEART RATE: 86 BPM | BODY MASS INDEX: 28.6 KG/M2 | SYSTOLIC BLOOD PRESSURE: 108 MMHG

## 2024-06-17 DIAGNOSIS — E11.9 TYPE 2 DIABETES MELLITUS WITHOUT COMPLICATION, WITHOUT LONG-TERM CURRENT USE OF INSULIN (HCC): Primary | ICD-10-CM

## 2024-06-17 DIAGNOSIS — E03.9 ACQUIRED HYPOTHYROIDISM: ICD-10-CM

## 2024-06-17 DIAGNOSIS — E78.5 HYPERLIPIDEMIA, UNSPECIFIED HYPERLIPIDEMIA TYPE: ICD-10-CM

## 2024-06-17 LAB — HBA1C MFR BLD: 6.5 %

## 2024-06-17 PROCEDURE — 99214 OFFICE O/P EST MOD 30 MIN: CPT | Performed by: NURSE PRACTITIONER

## 2024-06-17 PROCEDURE — 83036 HEMOGLOBIN GLYCOSYLATED A1C: CPT | Performed by: NURSE PRACTITIONER

## 2024-06-17 PROCEDURE — 3044F HG A1C LEVEL LT 7.0%: CPT | Performed by: NURSE PRACTITIONER

## 2024-06-17 RX ORDER — SEMAGLUTIDE 1.34 MG/ML
1 INJECTION, SOLUTION SUBCUTANEOUS
Qty: 9 ML | Refills: 3 | Status: SHIPPED | OUTPATIENT
Start: 2024-06-17 | End: 2025-06-17

## 2024-06-17 ASSESSMENT — ENCOUNTER SYMPTOMS
ABDOMINAL PAIN: 0
NAUSEA: 0
SHORTNESS OF BREATH: 0
VOMITING: 0
DIARRHEA: 0

## 2024-06-17 ASSESSMENT — PATIENT HEALTH QUESTIONNAIRE - PHQ9
1. LITTLE INTEREST OR PLEASURE IN DOING THINGS: NOT AT ALL
SUM OF ALL RESPONSES TO PHQ QUESTIONS 1-9: 0
SUM OF ALL RESPONSES TO PHQ9 QUESTIONS 1 & 2: 0
SUM OF ALL RESPONSES TO PHQ QUESTIONS 1-9: 0
2. FEELING DOWN, DEPRESSED OR HOPELESS: NOT AT ALL
SUM OF ALL RESPONSES TO PHQ9 QUESTIONS 1 & 2: 0
SUM OF ALL RESPONSES TO PHQ QUESTIONS 1-9: 0
SUM OF ALL RESPONSES TO PHQ QUESTIONS 1-9: 0
2. FEELING DOWN, DEPRESSED OR HOPELESS: NOT AT ALL
1. LITTLE INTEREST OR PLEASURE IN DOING THINGS: NOT AT ALL

## 2024-06-17 NOTE — PROGRESS NOTES
Rosa Murguia is a 43 y.o. female.    HPI:  Diabetes Follow-up--   Lab Results   Component Value Date    LABA1C 6.5 06/17/2024      Lab Results   Component Value Date    .2 03/17/2020     Checks sugars at home:No. patient does not test.  Last Eye Exam was 3/23.  Pt is on ACEI or ARB.  Pt complains ofnone. Pt denies foot ulcerations, paresthesia of the feet, polydipsia, polyuria, and visual disturbances.  She is compliant with Jardiance    Wt Readings from Last 3 Encounters:   06/17/24 82.8 kg (182 lb 9.6 oz)   12/07/23 84.6 kg (186 lb 9.6 oz)   08/08/23 86.2 kg (190 lb)     Hyperlipidemia:  Patient is mostly following a low fat, low cholesterol diet.  She is  exercising regularly (walking when weather is nice).  No new myalgias or GI upset on atorvastatin (Lipitor).         Lab Results   Component Value Date/Time    TRIG 219 03/17/2020 10:06 AM    HDL 51 11/28/2023 01:52 PM    HDL 44 04/18/2012 10:57 AM     11/28/2023 01:52 PM     Lab Results   Component Value Date    ALT 28 11/28/2023    AST 18 11/28/2023         Hypothyroidism: Recent symptoms: none. She denies fatigue, weight gain, cold intolerance, hair loss, dry skin, and constipation. Patient is  taking her medication consistently on an empty stomach.    Lab Results   Component Value Date    TSH 3.24 11/28/2023    TSH 3.80 03/16/2021    TSH 5.11 (H) 06/15/2020       Current Outpatient Medications   Medication Sig Dispense Refill    Semaglutide, 1 MG/DOSE, (OZEMPIC, 1 MG/DOSE,) 4 MG/3ML SOPN sc injection Inject 1 mg into the skin every 7 days 9 mL 3    lisinopril (PRINIVIL;ZESTRIL) 5 MG tablet TAKE 1 TABLET BY MOUTH EVERY DAY 90 tablet 0    atorvastatin (LIPITOR) 80 MG tablet Take 1 tablet by mouth daily 90 tablet 3    empagliflozin (JARDIANCE) 25 MG tablet Take 1 tablet by mouth daily 90 tablet 3    aspirin (ASPIRIN LOW DOSE) 81 MG EC tablet Take 1 tablet by mouth daily 90 tablet 3    levothyroxine (SYNTHROID) 100 MCG tablet Take 1 tablet by

## 2024-07-29 ENCOUNTER — TELEPHONE (OUTPATIENT)
Dept: ADMINISTRATIVE | Age: 44
End: 2024-07-29

## 2024-07-29 NOTE — TELEPHONE ENCOUNTER
Submitted PA for Ozempic (0.25 or 0.5 MG/DOSE) 2MG/3ML pen-injectors  Via CMM Key: [id number] STATUS:  Approval    Available without authorization     If this requires a response please respond to the pool. (P [facility] MEDICINE Pre-Auth).    Please advise patient thank you.

## 2024-09-17 ENCOUNTER — TELEPHONE (OUTPATIENT)
Dept: FAMILY MEDICINE CLINIC | Age: 44
End: 2024-09-17

## 2024-09-17 LAB — DIABETIC RETINOPATHY: NEGATIVE

## 2024-12-01 DIAGNOSIS — E03.9 ACQUIRED HYPOTHYROIDISM: ICD-10-CM

## 2024-12-01 DIAGNOSIS — E11.9 TYPE 2 DIABETES MELLITUS WITHOUT COMPLICATION, WITHOUT LONG-TERM CURRENT USE OF INSULIN (HCC): ICD-10-CM

## 2024-12-02 RX ORDER — LEVOTHYROXINE SODIUM 100 UG/1
100 TABLET ORAL DAILY
Qty: 90 TABLET | Refills: 2 | Status: SHIPPED | OUTPATIENT
Start: 2024-12-02

## 2024-12-02 RX ORDER — ASPIRIN 81 MG/1
81 TABLET, COATED ORAL DAILY
Qty: 90 TABLET | Refills: 2 | Status: SHIPPED | OUTPATIENT
Start: 2024-12-02

## 2024-12-02 RX ORDER — EMPAGLIFLOZIN 25 MG/1
25 TABLET, FILM COATED ORAL DAILY
Qty: 90 TABLET | Refills: 2 | Status: SHIPPED | OUTPATIENT
Start: 2024-12-02

## 2024-12-02 NOTE — TELEPHONE ENCOUNTER
Medication:   Requested Prescriptions     Pending Prescriptions Disp Refills    ASPIRIN LOW DOSE 81 MG EC tablet [Pharmacy Med Name: ASPIRIN EC 81 MG TABLET] 90 tablet 3     Sig: TAKE 1 TABLET BY MOUTH EVERY DAY    JARDIANCE 25 MG tablet [Pharmacy Med Name: JARDIANCE 25 MG TABLET] 90 tablet 3     Sig: TAKE 1 TABLET BY MOUTH EVERY DAY    levothyroxine (SYNTHROID) 100 MCG tablet [Pharmacy Med Name: LEVOTHYROXINE 100 MCG TABLET] 90 tablet 3     Sig: TAKE 1 TABLET BY MOUTH EVERY DAY       Last Filled:      Patient Phone Number: [phone] (home)     Last appt: 6/17/2024   Next appt: 12/11/2024    Last Labs DM:   Lab Results   Component Value Date/Time    LABA1C 6.5 06/17/2024 01:20 PM     Last Lipid:   Lab Results   Component Value Date/Time    CHOL 245 03/17/2020 10:06 AM    TRIG 219 03/17/2020 10:06 AM    HDL 51 11/28/2023 01:52 PM    HDL 44 04/18/2012 10:57 AM     Last PSA: No results found for: \"PSA\"  Last Thyroid:   Lab Results   Component Value Date/Time    TSH 3.24 11/28/2023 01:52 PM    T4FREE 1.3 11/28/2023 01:52 PM

## 2024-12-05 NOTE — TELEPHONE ENCOUNTER
Pt notified Goals of care discussion with the patient (face to face)  Asked about the patient's values, hopes, goals, and priorities  Patient would like to be FULL CODE for this admission   Patient would be okay want CPR or mechanical ventilation if needed for lifesaving measures  No Healthcare proxy or POA in place

## 2024-12-10 DIAGNOSIS — E11.9 TYPE 2 DIABETES MELLITUS WITHOUT COMPLICATION, WITHOUT LONG-TERM CURRENT USE OF INSULIN (HCC): ICD-10-CM

## 2024-12-10 DIAGNOSIS — E03.9 ACQUIRED HYPOTHYROIDISM: ICD-10-CM

## 2024-12-10 DIAGNOSIS — E78.5 HYPERLIPIDEMIA, UNSPECIFIED HYPERLIPIDEMIA TYPE: ICD-10-CM

## 2024-12-10 LAB
ALBUMIN SERPL-MCNC: 4.5 G/DL (ref 3.4–5)
ALBUMIN/GLOB SERPL: 1.7 {RATIO} (ref 1.1–2.2)
ALP SERPL-CCNC: 71 U/L (ref 40–129)
ALT SERPL-CCNC: 38 U/L (ref 10–40)
ANION GAP SERPL CALCULATED.3IONS-SCNC: 12 MMOL/L (ref 3–16)
AST SERPL-CCNC: 31 U/L (ref 15–37)
BASOPHILS # BLD: 0.1 K/UL (ref 0–0.2)
BASOPHILS NFR BLD: 1 %
BILIRUB SERPL-MCNC: 1.7 MG/DL (ref 0–1)
BUN SERPL-MCNC: 12 MG/DL (ref 7–20)
CALCIUM SERPL-MCNC: 9.7 MG/DL (ref 8.3–10.6)
CHLORIDE SERPL-SCNC: 98 MMOL/L (ref 99–110)
CHOLEST SERPL-MCNC: 192 MG/DL (ref 0–199)
CO2 SERPL-SCNC: 24 MMOL/L (ref 21–32)
CREAT SERPL-MCNC: 0.8 MG/DL (ref 0.6–1.1)
CREAT UR-MCNC: 98.5 MG/DL (ref 28–259)
DEPRECATED RDW RBC AUTO: 13.4 % (ref 12.4–15.4)
EOSINOPHIL # BLD: 0.1 K/UL (ref 0–0.6)
EOSINOPHIL NFR BLD: 1.5 %
GFR SERPLBLD CREATININE-BSD FMLA CKD-EPI: >90 ML/MIN/{1.73_M2}
GLUCOSE P FAST SERPL-MCNC: 112 MG/DL (ref 70–99)
HCT VFR BLD AUTO: 46.5 % (ref 36–48)
HDLC SERPL-MCNC: 56 MG/DL (ref 40–60)
HGB BLD-MCNC: 16.1 G/DL (ref 12–16)
LDL CHOLESTEROL: 115 MG/DL
LYMPHOCYTES # BLD: 1.5 K/UL (ref 1–5.1)
LYMPHOCYTES NFR BLD: 24 %
MCH RBC QN AUTO: 31.5 PG (ref 26–34)
MCHC RBC AUTO-ENTMCNC: 34.6 G/DL (ref 31–36)
MCV RBC AUTO: 90.9 FL (ref 80–100)
MICROALBUMIN UR DL<=1MG/L-MCNC: <1.2 MG/DL
MICROALBUMIN/CREAT UR: NORMAL MG/G (ref 0–30)
MONOCYTES # BLD: 0.5 K/UL (ref 0–1.3)
MONOCYTES NFR BLD: 7.6 %
NEUTROPHILS # BLD: 4 K/UL (ref 1.7–7.7)
NEUTROPHILS NFR BLD: 65.9 %
PLATELET # BLD AUTO: 284 K/UL (ref 135–450)
PMV BLD AUTO: 9.9 FL (ref 5–10.5)
POTASSIUM SERPL-SCNC: 4.3 MMOL/L (ref 3.5–5.1)
PROT SERPL-MCNC: 7.2 G/DL (ref 6.4–8.2)
RBC # BLD AUTO: 5.12 M/UL (ref 4–5.2)
SODIUM SERPL-SCNC: 134 MMOL/L (ref 136–145)
T4 FREE SERPL-MCNC: 1.2 NG/DL (ref 0.9–1.8)
TRIGL SERPL-MCNC: 103 MG/DL (ref 0–150)
TSH SERPL DL<=0.005 MIU/L-ACNC: 3.64 UIU/ML (ref 0.27–4.2)
VLDLC SERPL CALC-MCNC: 21 MG/DL
WBC # BLD AUTO: 6 K/UL (ref 4–11)

## 2024-12-11 LAB
EST. AVERAGE GLUCOSE BLD GHB EST-MCNC: 128.4 MG/DL
HBA1C MFR BLD: 6.1 %

## 2024-12-12 ENCOUNTER — OFFICE VISIT (OUTPATIENT)
Dept: FAMILY MEDICINE CLINIC | Age: 44
End: 2024-12-12

## 2024-12-12 VITALS
BODY MASS INDEX: 28.79 KG/M2 | OXYGEN SATURATION: 98 % | SYSTOLIC BLOOD PRESSURE: 98 MMHG | DIASTOLIC BLOOD PRESSURE: 64 MMHG | HEART RATE: 94 BPM | HEIGHT: 67 IN | WEIGHT: 183.4 LBS

## 2024-12-12 DIAGNOSIS — Z12.11 SCREENING FOR COLON CANCER: ICD-10-CM

## 2024-12-12 DIAGNOSIS — E11.9 TYPE 2 DIABETES MELLITUS WITHOUT COMPLICATION, WITHOUT LONG-TERM CURRENT USE OF INSULIN (HCC): ICD-10-CM

## 2024-12-12 DIAGNOSIS — Z00.00 WELL ADULT EXAM: Primary | ICD-10-CM

## 2024-12-12 DIAGNOSIS — E78.5 HYPERLIPIDEMIA, UNSPECIFIED HYPERLIPIDEMIA TYPE: ICD-10-CM

## 2024-12-12 SDOH — ECONOMIC STABILITY: FOOD INSECURITY: WITHIN THE PAST 12 MONTHS, YOU WORRIED THAT YOUR FOOD WOULD RUN OUT BEFORE YOU GOT MONEY TO BUY MORE.: NEVER TRUE

## 2024-12-12 SDOH — ECONOMIC STABILITY: FOOD INSECURITY: WITHIN THE PAST 12 MONTHS, THE FOOD YOU BOUGHT JUST DIDN'T LAST AND YOU DIDN'T HAVE MONEY TO GET MORE.: NEVER TRUE

## 2024-12-12 SDOH — ECONOMIC STABILITY: INCOME INSECURITY: HOW HARD IS IT FOR YOU TO PAY FOR THE VERY BASICS LIKE FOOD, HOUSING, MEDICAL CARE, AND HEATING?: NOT HARD AT ALL

## 2024-12-12 ASSESSMENT — PATIENT HEALTH QUESTIONNAIRE - PHQ9
SUM OF ALL RESPONSES TO PHQ QUESTIONS 1-9: 0
1. LITTLE INTEREST OR PLEASURE IN DOING THINGS: NOT AT ALL
2. FEELING DOWN, DEPRESSED OR HOPELESS: NOT AT ALL
SUM OF ALL RESPONSES TO PHQ QUESTIONS 1-9: 0
SUM OF ALL RESPONSES TO PHQ QUESTIONS 1-9: 0
SUM OF ALL RESPONSES TO PHQ9 QUESTIONS 1 & 2: 0
SUM OF ALL RESPONSES TO PHQ QUESTIONS 1-9: 0

## 2024-12-12 NOTE — PATIENT INSTRUCTIONS
usually need less than 15 grams of carbohydrate. Check with your doctor or diabetes educator for the amount that is right for your child.  Here are examples of quick-sugar foods that have 15 grams of carbohydrate:  3 to 4 glucose tablets  1 tablespoon (3 teaspoons) of table sugar  1 tablespoon (3 teaspoons) honey  ½ cup to ¾ cup (4 to 6 ounces) of fruit juice or regular (not diet) soda  Hard candy (such as 6 Life Savers)  If you have problems with severe low blood sugar, someone else may have to give you glucagon. This is a hormone that raises blood sugar levels quickly.  How can you prevent low blood sugar?  You can take steps to prevent low blood sugar.  Follow your treatment plan. Take your insulin or other diabetes medicine exactly as your doctor prescribed it. Talk with your doctor if you're having low blood sugar often. Your medicine may need to be adjusted if it's causing your low blood sugar.  Check your blood sugar levels often. This helps you find early changes before an emergency happens.  Keep a quick-sugar food with you in case your blood sugar level drops low.  Eat small meals more often so that you don't get too hungry between meals. Don't skip meals.  Balance extra exercise with eating more. Check your blood sugar and learn how it changes after exercise. If your blood sugar stays at a normal level, you may not need to eat after you exercise.  Limit how much alcohol you drink. Alcohol can make low blood sugar go even lower. Don't drink alcohol if you have problems recognizing the early signs of low blood sugar.  Keep a diary of your symptoms. This helps you learn when changes in your body may signal low blood sugar. And keep track of how often you have low blood sugar, including when you last ate and what you ate. This will help you learn what causes your blood sugar to drop.  Learn about diabetes and low blood sugar. Support groups or a diabetes education center can help you understand how medicines,

## 2024-12-12 NOTE — PROGRESS NOTES
Chief Complaint:   Rosa Murguia is a 44 y.o. female who presents for complete physical examination.    History of Present Illness:      DM: she is compliant with Jardiance and Ozempic. Tolerating well. Sporadically checks sugar at home. Will check maybe once a week. Denies hypoglycemia.   Hemoglobin A1C (%)   Date Value   12/10/2024 6.1   06/17/2024 6.5   12/07/2023 7.5   08/03/2023 7.1     Wt Readings from Last 3 Encounters:   12/12/24 83.2 kg (183 lb 6.4 oz)   06/17/24 82.8 kg (182 lb 9.6 oz)   12/07/23 84.6 kg (186 lb 9.6 oz)     Hyperlipidemia:  Patient is not following a low fat, low cholesterol diet.  She is  exercising regularly.  No new myalgias or GI upset on atorvastatin (Lipitor). Eating fast food most day due work schedule, red meat several times/week         Lab Results   Component Value Date/Time    TRIG 219 03/17/2020 10:06 AM    HDL 56 12/10/2024 02:09 PM    HDL 44 04/18/2012 10:57 AM     12/10/2024 02:09 PM     11/28/2023 01:52 PM     Lab Results   Component Value Date    ALT 38 12/10/2024    AST 31 12/10/2024            Past Medical History:   Diagnosis Date    Acquired hypothyroidism 5/21/2018    BRCA1 negative     Class 1 obesity due to excess calories with serious comorbidity and body mass index (BMI) of 31.0 to 31.9 in adult     Degeneration of lumbar or lumbosacral intervertebral disc 12/2/2009    Hyperlipidemia     Lumbosacral spondylosis without myelopathy 12/2/2009    Uncontrolled type 2 diabetes mellitus without complication, without long-term current use of insulin 5/21/2018       Past Surgical History:   Procedure Laterality Date    BACK SURGERY  2010    L4-L5 partial disctectomy        Outpatient Medications Marked as Taking for the 12/12/24 encounter (Office Visit) with Pamela Hernandes APRN - CNP   Medication Sig Dispense Refill    empagliflozin (JARDIANCE) 25 MG tablet Take 1 tablet by mouth daily 90 tablet 3    ASPIRIN LOW DOSE 81 MG EC tablet TAKE 1 TABLET BY MOUTH

## 2024-12-12 NOTE — ASSESSMENT & PLAN NOTE
Borderline control  Recommend tightening up diet  If LDL over 100 at follow up, will switch to Crestor 40 mg

## 2025-01-05 ENCOUNTER — PATIENT MESSAGE (OUTPATIENT)
Dept: FAMILY MEDICINE CLINIC | Age: 45
End: 2025-01-05

## 2025-01-07 ENCOUNTER — TELEPHONE (OUTPATIENT)
Dept: FAMILY MEDICINE CLINIC | Age: 45
End: 2025-01-07

## 2025-01-08 NOTE — TELEPHONE ENCOUNTER
This is the first  we have rec'd a request for this patients Jardiance. Will submit today    Submitted PA for Jardiance 25MG tablet   [employer]ia [employer] Key: [id number]) STATUS: PENDING.    Follow up done daily; if no decision with in three days we will refax.  If another three days goes by with no decision will call the insurance for status.

## 2025-01-15 ENCOUNTER — TELEPHONE (OUTPATIENT)
Dept: FAMILY MEDICINE CLINIC | Age: 45
End: 2025-01-15

## 2025-01-15 NOTE — TELEPHONE ENCOUNTER
Please let pt know Jardiance has been approved by insurance and can be picked up at pharmacy if she hasn't done so already     Ok to close

## 2025-02-28 DIAGNOSIS — E11.9 TYPE 2 DIABETES MELLITUS WITHOUT COMPLICATION, WITHOUT LONG-TERM CURRENT USE OF INSULIN (HCC): ICD-10-CM

## 2025-02-28 NOTE — TELEPHONE ENCOUNTER
Medication:   Requested Prescriptions     Pending Prescriptions Disp Refills    lisinopril (PRINIVIL;ZESTRIL) 5 MG tablet [Pharmacy Med Name: LISINOPRIL 5 MG TABLET] 90 tablet 0     Sig: TAKE 1 TABLET BY MOUTH EVERY DAY       Last Filled:  5/30/2024, 90, 0    Patient Phone Number: [phone] (home)     Last appt: 12/12/2024   Next appt: 6/12/2025    Lab Results   Component Value Date     (L) 12/10/2024    K 4.3 12/10/2024    CL 98 (L) 12/10/2024    CO2 24 12/10/2024    BUN 12 12/10/2024    CREATININE 0.8 12/10/2024    GLUCOSE 264 (H) 03/17/2020    CALCIUM 9.7 12/10/2024    BILITOT 1.7 (H) 12/10/2024    ALKPHOS 71 12/10/2024    AST 31 12/10/2024    ALT 38 12/10/2024    LABGLOM >90 12/10/2024    GFRAA >60 03/16/2021    AGRATIO 1.7 12/10/2024    GLOB 3.0 03/16/2021

## 2025-03-01 RX ORDER — LISINOPRIL 5 MG/1
TABLET ORAL
Qty: 90 TABLET | Refills: 0 | Status: SHIPPED | OUTPATIENT
Start: 2025-03-01

## 2025-03-18 NOTE — TELEPHONE ENCOUNTER
From: Brigitte Sandoval  To: Rafaela Love, DULCE - CNP  Sent: 10/22/2021 5:58 PM EDT  Subject: Prescription Question    Hello,    I have tried to get my Trulicity refilled at [facility] for over a week now. I am out. The pharmacist keeps telling me I can't use the $25 coupon until I reach my deductible so the cost is going to be $800. I've explained to him multiple times that's not how it works. It's a manufacturers coupon that has nothing to do with my insurance deductible. I've been on Trulicity for months and it is a fight each month to get it filled. I want to change pharmacies. I don't know what to do. Can you help by contacting the pharmacy?
Please call [employer] rep and explain pharmacist at [facility] is having issues with discount card and Trulicity Rx.  Please ask if they can intervene
Spoke with rep about Trulicity. Rep stated that patient has to reach deductible in order for coupon to work. Pt notified. Rep said she will bring in samples tomorrow for patient.
Detail Level: Zone
Detail Level: Simple

## 2025-04-09 ENCOUNTER — E-VISIT (OUTPATIENT)
Dept: PRIMARY CARE CLINIC | Age: 45
End: 2025-04-09
Payer: COMMERCIAL

## 2025-04-09 DIAGNOSIS — J06.9 UPPER RESPIRATORY TRACT INFECTION, UNSPECIFIED TYPE: Primary | ICD-10-CM

## 2025-04-09 PROCEDURE — 99422 OL DIG E/M SVC 11-20 MIN: CPT | Performed by: NURSE PRACTITIONER

## 2025-04-09 RX ORDER — AZITHROMYCIN 250 MG/1
TABLET, FILM COATED ORAL
Qty: 1 PACKET | Refills: 0 | Status: SHIPPED | OUTPATIENT
Start: 2025-04-09 | End: 2025-04-19

## 2025-04-09 ASSESSMENT — LIFESTYLE VARIABLES: SMOKING_STATUS: NO, I'VE NEVER SMOKED

## 2025-04-10 NOTE — PROGRESS NOTES
Rosa Murguia (1980) initiated an asynchronous digital communication through [employer].    HPI: per patient questionnaire     Exam: not applicable    Diagnoses and all orders for this visit:  Diagnoses and all orders for this visit:    Upper respiratory tract infection, unspecified type    Other orders  -     azithromycin (ZITHROMAX) 250 MG tablet; 2 tablets now then 1 daily until gone.    Symptoms for a week.  Antibiotic sent.  Supportive care suggestions provided.  Follow-up PCP as needed      18 minutes were spent on the digital evaluation and management of this patient.    Claudine Cali, DULCE - CNP

## 2025-05-29 DIAGNOSIS — E11.9 TYPE 2 DIABETES MELLITUS WITHOUT COMPLICATION, WITHOUT LONG-TERM CURRENT USE OF INSULIN (HCC): ICD-10-CM

## 2025-05-29 RX ORDER — LISINOPRIL 5 MG/1
5 TABLET ORAL DAILY
Qty: 90 TABLET | Refills: 0 | Status: SHIPPED | OUTPATIENT
Start: 2025-05-29

## 2025-05-29 NOTE — TELEPHONE ENCOUNTER
Medication:   Requested Prescriptions     Pending Prescriptions Disp Refills    lisinopril (PRINIVIL;ZESTRIL) 5 MG tablet [Pharmacy Med Name: LISINOPRIL 5 MG TABLET] 90 tablet 0     Sig: TAKE 1 TABLET BY MOUTH EVERY DAY       Last Filled:  03/01/2025    Patient Phone Number: [phone] (home)     Last appt: 12/12/2024   Next appt: 6/12/2025    Lab Results   Component Value Date     (L) 12/10/2024    K 4.3 12/10/2024    CL 98 (L) 12/10/2024    CO2 24 12/10/2024    BUN 12 12/10/2024    CREATININE 0.8 12/10/2024    GLUCOSE 264 (H) 03/17/2020    CALCIUM 9.7 12/10/2024    BILITOT 1.7 (H) 12/10/2024    ALKPHOS 71 12/10/2024    AST 31 12/10/2024    ALT 38 12/10/2024    LABGLOM >90 12/10/2024    GFRAA >60 03/16/2021    AGRATIO 1.7 12/10/2024    GLOB 3.0 03/16/2021     Last Labs DM:   Lab Results   Component Value Date/Time    LABA1C 6.1 12/10/2024 02:09 PM

## 2025-06-11 DIAGNOSIS — E78.5 HYPERLIPIDEMIA, UNSPECIFIED HYPERLIPIDEMIA TYPE: ICD-10-CM

## 2025-06-11 DIAGNOSIS — E11.9 TYPE 2 DIABETES MELLITUS WITHOUT COMPLICATION, WITHOUT LONG-TERM CURRENT USE OF INSULIN (HCC): ICD-10-CM

## 2025-06-11 LAB
CHOLEST SERPL-MCNC: 187 MG/DL (ref 0–199)
HDLC SERPL-MCNC: 47 MG/DL (ref 40–60)
LDL CHOLESTEROL: 115 MG/DL
TRIGL SERPL-MCNC: 126 MG/DL (ref 0–150)
VLDLC SERPL CALC-MCNC: 25 MG/DL

## 2025-06-12 ENCOUNTER — TELEPHONE (OUTPATIENT)
Dept: FAMILY MEDICINE CLINIC | Age: 45
End: 2025-06-12

## 2025-06-12 ENCOUNTER — OFFICE VISIT (OUTPATIENT)
Dept: FAMILY MEDICINE CLINIC | Age: 45
End: 2025-06-12

## 2025-06-12 ENCOUNTER — RESULTS FOLLOW-UP (OUTPATIENT)
Dept: FAMILY MEDICINE CLINIC | Age: 45
End: 2025-06-12

## 2025-06-12 VITALS
DIASTOLIC BLOOD PRESSURE: 70 MMHG | SYSTOLIC BLOOD PRESSURE: 104 MMHG | HEART RATE: 86 BPM | BODY MASS INDEX: 29.16 KG/M2 | OXYGEN SATURATION: 98 % | WEIGHT: 186.2 LBS

## 2025-06-12 DIAGNOSIS — E11.9 TYPE 2 DIABETES MELLITUS WITHOUT COMPLICATION, WITHOUT LONG-TERM CURRENT USE OF INSULIN (HCC): Primary | ICD-10-CM

## 2025-06-12 DIAGNOSIS — E03.9 ACQUIRED HYPOTHYROIDISM: ICD-10-CM

## 2025-06-12 DIAGNOSIS — Z23 NEED FOR VACCINATION: ICD-10-CM

## 2025-06-12 DIAGNOSIS — E78.5 HYPERLIPIDEMIA, UNSPECIFIED HYPERLIPIDEMIA TYPE: ICD-10-CM

## 2025-06-12 LAB
EST. AVERAGE GLUCOSE BLD GHB EST-MCNC: 159.9 MG/DL
HBA1C MFR BLD: 7.2 %

## 2025-06-12 RX ORDER — ROSUVASTATIN CALCIUM 40 MG/1
40 TABLET, COATED ORAL DAILY
Qty: 90 TABLET | Refills: 1 | Status: SHIPPED | OUTPATIENT
Start: 2025-06-12

## 2025-06-12 SDOH — ECONOMIC STABILITY: FOOD INSECURITY: WITHIN THE PAST 12 MONTHS, THE FOOD YOU BOUGHT JUST DIDN'T LAST AND YOU DIDN'T HAVE MONEY TO GET MORE.: NEVER TRUE

## 2025-06-12 SDOH — ECONOMIC STABILITY: FOOD INSECURITY: WITHIN THE PAST 12 MONTHS, YOU WORRIED THAT YOUR FOOD WOULD RUN OUT BEFORE YOU GOT MONEY TO BUY MORE.: NEVER TRUE

## 2025-06-12 ASSESSMENT — ENCOUNTER SYMPTOMS
SHORTNESS OF BREATH: 0
ABDOMINAL PAIN: 0
DIARRHEA: 0
VOMITING: 0
NAUSEA: 0

## 2025-06-12 ASSESSMENT — PATIENT HEALTH QUESTIONNAIRE - PHQ9
SUM OF ALL RESPONSES TO PHQ QUESTIONS 1-9: 0
SUM OF ALL RESPONSES TO PHQ QUESTIONS 1-9: 0
1. LITTLE INTEREST OR PLEASURE IN DOING THINGS: NOT AT ALL
SUM OF ALL RESPONSES TO PHQ QUESTIONS 1-9: 0
2. FEELING DOWN, DEPRESSED OR HOPELESS: NOT AT ALL
SUM OF ALL RESPONSES TO PHQ QUESTIONS 1-9: 0

## 2025-06-12 NOTE — TELEPHONE ENCOUNTER
Please ensure this has been scanned incorrectly to satisfy HM   Date of c-scope 2/6/25  Repeat c-scope in 10 yrs  Then ok to close

## 2025-06-12 NOTE — PROGRESS NOTES
replace with:  Orders:  -     rosuvastatin (CRESTOR) 40 MG tablet; Take 1 tablet by mouth daily, Disp-90 tablet, R-1Normal  -     Comprehensive Metabolic Panel, Fasting; Future  -     Lipid, Fasting; Future  4. Need for vaccination  -     Pneumococcal, PCV20, PREVNAR 20, (age 6w+), IM, PF     Return in about 6 months (around 12/12/2025) for diabetes follow up.  See pt instructions  Discussed use, benefit, and side effects of prescribed medications. All patient questions answered.  Pt voiced understanding.

## 2025-06-12 NOTE — ASSESSMENT & PLAN NOTE
Chronic, at goal (stable), continue current treatment plan  Recommend tighter control of diet  Recommended at least 30 minutes of aerobic exercise daily.

## 2025-06-14 DIAGNOSIS — E11.9 TYPE 2 DIABETES MELLITUS WITHOUT COMPLICATION, WITHOUT LONG-TERM CURRENT USE OF INSULIN (HCC): ICD-10-CM

## 2025-06-14 NOTE — TELEPHONE ENCOUNTER
Medication:   Requested Prescriptions     Pending Prescriptions Disp Refills    OZEMPIC, 1 MG/DOSE, 4 MG/3ML SOPN sc injection [Pharmacy Med Name: OZEMPIC 4 MG/3 ML (1 MG/DOSE)]  3     Sig: INJECT 1 MG INTO THE SKIN EVERY 7 DAYS       Last Filled: 06/17/2024     Patient Phone Number: [phone] (home)     Last appt: 6/12/2025   Next appt: Visit date not found    Last Labs DM:   Lab Results   Component Value Date/Time    LABA1C 7.2 06/11/2025 03:20 PM

## 2025-06-15 RX ORDER — SEMAGLUTIDE 1.34 MG/ML
1 INJECTION, SOLUTION SUBCUTANEOUS
Qty: 9 ML | Refills: 1 | Status: SHIPPED | OUTPATIENT
Start: 2025-06-15 | End: 2025-12-12

## 2025-07-29 ENCOUNTER — PATIENT MESSAGE (OUTPATIENT)
Dept: FAMILY MEDICINE CLINIC | Age: 45
End: 2025-07-29

## 2025-09-04 DIAGNOSIS — E03.9 ACQUIRED HYPOTHYROIDISM: ICD-10-CM

## 2025-09-04 DIAGNOSIS — E11.9 TYPE 2 DIABETES MELLITUS WITHOUT COMPLICATION, WITHOUT LONG-TERM CURRENT USE OF INSULIN (HCC): ICD-10-CM

## 2025-09-04 RX ORDER — ASPIRIN 81 MG/1
81 TABLET, COATED ORAL DAILY
Qty: 90 TABLET | Refills: 2 | Status: SHIPPED | OUTPATIENT
Start: 2025-09-04

## 2025-09-04 RX ORDER — LEVOTHYROXINE SODIUM 100 UG/1
100 TABLET ORAL DAILY
Qty: 90 TABLET | Refills: 2 | Status: SHIPPED | OUTPATIENT
Start: 2025-09-04

## 2025-09-05 DIAGNOSIS — E11.9 TYPE 2 DIABETES MELLITUS WITHOUT COMPLICATION, WITHOUT LONG-TERM CURRENT USE OF INSULIN (HCC): ICD-10-CM

## 2025-09-05 RX ORDER — LISINOPRIL 5 MG/1
5 TABLET ORAL DAILY
Qty: 90 TABLET | Refills: 0 | Status: SHIPPED | OUTPATIENT
Start: 2025-09-05